# Patient Record
Sex: MALE | Race: WHITE | Employment: FULL TIME | URBAN - METROPOLITAN AREA
[De-identification: names, ages, dates, MRNs, and addresses within clinical notes are randomized per-mention and may not be internally consistent; named-entity substitution may affect disease eponyms.]

---

## 2021-03-22 ENCOUNTER — HOSPITAL ENCOUNTER (EMERGENCY)
Age: 31
Discharge: HOME | End: 2021-03-22

## 2021-03-22 VITALS
HEIGHT: 67 IN | DIASTOLIC BLOOD PRESSURE: 88 MMHG | TEMPERATURE: 97.3 F | OXYGEN SATURATION: 97 % | BODY MASS INDEX: 31.39 KG/M2 | WEIGHT: 200 LBS | RESPIRATION RATE: 18 BRPM | SYSTOLIC BLOOD PRESSURE: 149 MMHG | HEART RATE: 103 BPM

## 2021-03-22 DIAGNOSIS — L05.01 PILONIDAL ABSCESS: Primary | ICD-10-CM

## 2021-03-22 PROCEDURE — 87205 SMEAR GRAM STAIN: CPT

## 2021-03-22 PROCEDURE — 99283 EMERGENCY DEPT VISIT LOW MDM: CPT

## 2021-03-22 PROCEDURE — 10081 I&D PILONIDAL CYST COMP: CPT

## 2021-03-22 PROCEDURE — 87186 SC STD MICRODIL/AGAR DIL: CPT

## 2021-03-22 PROCEDURE — 87077 CULTURE AEROBIC IDENTIFY: CPT

## 2021-03-22 PROCEDURE — 87075 CULTR BACTERIA EXCEPT BLOOD: CPT

## 2021-03-22 PROCEDURE — 87070 CULTURE OTHR SPECIMN AEROBIC: CPT

## 2021-03-22 PROCEDURE — 87185 SC STD ENZYME DETCJ PER NZM: CPT

## 2021-03-22 RX ORDER — IBUPROFEN 800 MG/1
800 TABLET ORAL EVERY 8 HOURS PRN
Qty: 20 TABLET | Refills: 0 | Status: ON HOLD | OUTPATIENT
Start: 2021-03-22 | End: 2022-01-12 | Stop reason: HOSPADM

## 2021-03-22 RX ORDER — SULFAMETHOXAZOLE AND TRIMETHOPRIM 800; 160 MG/1; MG/1
2 TABLET ORAL 2 TIMES DAILY
Qty: 56 TABLET | Refills: 0 | Status: SHIPPED | OUTPATIENT
Start: 2021-03-22 | End: 2021-04-05

## 2021-03-22 ASSESSMENT — ENCOUNTER SYMPTOMS
ABDOMINAL PAIN: 0
CONSTIPATION: 0
DIARRHEA: 0

## 2021-03-22 ASSESSMENT — PAIN DESCRIPTION - DESCRIPTORS: DESCRIPTORS: ACHING;BURNING;PRESSURE;THROBBING

## 2021-03-22 NOTE — ED PROVIDER NOTES
[address] [facility] ED  eMERGENCYdEPARTMENT eNCOUnter      Pt Name: Loretta Chino  MRN: [id number]  [email or web address] [id number] evaluation: 3/22/2021  Kyle Ervin PA-C    CHIEF COMPLAINT       Chief Complaint   Patient presents with    Abscess     buttocks         HISTORY OF PRESENT ILLNESS  (Location/Symptom, Timing/Onset, Context/Setting, Quality, Duration, Modifying Factors, Severity.)   Loretta Chino is a 31 y.o. male who presents to the emergency department with a 4-day history of red swollen tender buttocks (pilonidal region). Patient reports to longstanding history of recurrent pilonidal cysts. He has seen the [address] [place] [address] numerous times for the same. He was hoping he can see a surgeon for definitive care. Moving bowels normally. Pain with movement or sitting. No numbness or extremities. The history is provided by the patient. Nursing Notes were reviewed and I agree. REVIEW OF SYSTEMS    (2-9 systems for level 4, 10 or more for level 5)     Review of Systems   Constitutional: Negative for fever. Gastrointestinal: Negative for abdominal pain, constipation and diarrhea. Skin:        Swollen red pilonidal region   Neurological: Negative for weakness and numbness. as noted above the remainder of the review of systems was reviewed and negative. PAST MEDICAL HISTORY   History reviewed. No pertinent past medical history. SURGICAL HISTORY     History reviewed. No pertinent surgical history. CURRENT MEDICATIONS       Discharge Medication List as of 3/22/2021  7:39 PM          ALLERGIES     Patient has no known allergies. HISTORY     History reviewed. No pertinent family history.        SOCIAL HISTORY       Social History     Socioeconomic History    Marital status:      Spouse name: None    Number of children: None    Years of education: None    Highest education level: None   Occupational History    None   Social Needs    Financial resource strain: None    Food insecurity     Worry: None     Inability: None    Transportation needs     Medical: None     Non-medical: None   Tobacco Use    Smoking status: Heavy Tobacco Smoker    Smokeless tobacco: Never Used   Substance and Sexual Activity    Alcohol use: Yes    Drug use: Never    Sexual activity: Yes   Lifestyle    Physical activity     Days per week: None     Minutes per session: None    Stress: None   Relationships    Social connections     Talks on phone: None     Gets together: None     Attends Sikhism service: None     Active member of club or organization: None     Attends meetings of clubs or organizations: None     Relationship status: None    Intimate partner violence     Fear of current or ex partner: None     Emotionally abused: None     Physically abused: None     Forced sexual activity: None   Other Topics Concern    None   Social History Narrative    None       SCREENINGS           PHYSICAL EXAM    (up to 7 forlevel 4, 8 or more for level 5)     ED Triage Vitals [03/22/21 1853]   BP Temp Temp Source Pulse Resp SpO2 Height Weight   (!) 149/88 97.3 °F (36.3 °C) Temporal 103 18 97 % 5' 7\" (1.702 m) 200 lb (90.7 kg)       Physical Exam  Vitals signs and nursing note reviewed. Constitutional:       General: He is not in acute distress. Appearance: He is well-developed. He is not diaphoretic. HENT:      Head: Normocephalic and atraumatic. Right Ear: External ear normal.      Left Ear: External ear normal.      Nose: Nose normal.   Eyes:      Conjunctiva/sclera: Conjunctivae normal.      Pupils: Pupils are equal, round, and reactive to light. Neck:      Musculoskeletal: Normal range of motion and neck supple. Pulmonary:      Effort: Pulmonary effort is normal.   Skin:     General: Skin is warm and dry. Neurological:      Mental Status: He is alert and oriented to person, place, and time.    Psychiatric:         Behavior: Behavior normal.           DIAGNOSTIC RESULTS RADIOLOGY:   Non-plain film images such as CT, Ultrasound and MRI are read by the radiologist. Plain radiographic images are visualized and preliminarilyinterpreted by Rosa Mott PA-C with the below findings:        Interpretation per the Radiologist below, if available at the time of this note:    No orders to display       LABS:  Labs Reviewed   CULTURE, ANAEROBIC AND AEROBIC       All other labs were within normal range or not returnedas of this dictation. EMERGENCYDEPARTMENT COURSE and DIFFERENTIAL DIAGNOSIS/MDM:   Vitals:    Vitals:    03/22/21 1853   BP: (!) 149/88   Pulse: 103   Resp: 18   Temp: 97.3 °F (36.3 °C)   TempSrc: Temporal   SpO2: 97%   Weight: 200 lb (90.7 kg)   Height: 5' 7\" (1.702 m)           MDM    PROCEDURES:    Procedures    PROCEDURES: Incision and Drainage:  Location: pilonidal region  Size: 5 cm x 2 cm  The Incision and Drainage procedure was explained to the patient and I receive verbal consent from them. Abscess was prepped with hibicleans. Patient declined injectable anesthetic so area was anesthetized with ethyl chloride topical spray. The area of most fluctuance was identified, and a number 11 blade was used to make a 1 cm incision. A blunt probe was used to deloculated the wounds and a moderate amount of purulent material was expressed. Wound culture was obtained. The wound was then packed with 0.25-inch iodoform gauze x 1.2 \" wick (patient cannot tolerate packing beyond this). The patient experienced some pain but generally tolerated the procedure quite well. An ABD pad was taped in place and the patient was given instructions to start warm compresses for the next 2-3 days at home. Patient will follow up with general surgery either through our referral or through the [facility]. A note for work was provided. Prescriptions for Motrin 800 and Bactrim DS were provided.       ELECTRONIC SIGNATURE   Rosa Mott PA-C   3/22/2021 7:47 PM         FINAL IMPRESSION      1. Pilonidal abscess          DISPOSITION/PLAN   DISPOSITION Decision To Discharge 03/22/2021 07:23:01 PM      PATIENT REFERRED TO:  Yissel Lujan MD  [address] [address] [phone]    In 2 days        DISCHARGE MEDICATIONS:  Discharge Medication List as of 3/22/2021  7:39 PM      START taking these medications    Details   sulfamethoxazole-trimethoprim (BACTRIM DS) 800-160 MG per tablet Take 2 tablets by mouth 2 times daily for 14 days, Disp-56 tablet, R-0Normal      ibuprofen (ADVIL;MOTRIN) 800 MG tablet Take 1 tablet by mouth every 8 hours as needed for Pain or Fever, Disp-20 tablet, R-0Normal             (Please note thatportions of this note were completed with a voice recognition program.  Efforts were made to edit the dictations but occasionally words are mis-transcribed.)    DOTTY Chong PA-C  03/22/21 1947

## 2021-03-26 LAB
ANAEROBIC CULTURE: ABNORMAL
GRAM STAIN RESULT: ABNORMAL
ORGANISM: ABNORMAL
ORGANISM: ABNORMAL
WOUND/ABSCESS: ABNORMAL

## 2021-06-28 ENCOUNTER — HOSPITAL ENCOUNTER (EMERGENCY)
Age: 31
Discharge: HOME | End: 2021-06-28

## 2021-06-28 VITALS
RESPIRATION RATE: 18 BRPM | WEIGHT: 190 LBS | SYSTOLIC BLOOD PRESSURE: 123 MMHG | OXYGEN SATURATION: 96 % | HEART RATE: 113 BPM | BODY MASS INDEX: 30.53 KG/M2 | HEIGHT: 66 IN | DIASTOLIC BLOOD PRESSURE: 68 MMHG | TEMPERATURE: 98 F

## 2021-06-28 DIAGNOSIS — L05.01 PILONIDAL ABSCESS: Primary | ICD-10-CM

## 2021-06-28 PROCEDURE — 99284 EMERGENCY DEPT VISIT MOD MDM: CPT

## 2021-06-28 PROCEDURE — 10081 I&D PILONIDAL CYST COMP: CPT

## 2021-06-28 PROCEDURE — 87075 CULTR BACTERIA EXCEPT BLOOD: CPT

## 2021-06-28 PROCEDURE — 87070 CULTURE OTHR SPECIMN AEROBIC: CPT

## 2021-06-28 PROCEDURE — 87205 SMEAR GRAM STAIN: CPT

## 2021-06-28 PROCEDURE — 6370000000 HC RX 637 (ALT 250 FOR IP): Performed by: PHYSICIAN ASSISTANT

## 2021-06-28 RX ORDER — ETHYL CHLORIDE 100 %
AEROSOL, SPRAY (ML) TOPICAL
Status: COMPLETED | OUTPATIENT
Start: 2021-06-28 | End: 2021-06-28

## 2021-06-28 RX ORDER — SULFAMETHOXAZOLE AND TRIMETHOPRIM 800; 160 MG/1; MG/1
1 TABLET ORAL ONCE
Status: COMPLETED | OUTPATIENT
Start: 2021-06-28 | End: 2021-06-28

## 2021-06-28 RX ORDER — HYDROCODONE BITARTRATE AND ACETAMINOPHEN 5; 325 MG/1; MG/1
1 TABLET ORAL EVERY 8 HOURS PRN
Qty: 9 TABLET | Refills: 0 | Status: SHIPPED | OUTPATIENT
Start: 2021-06-28 | End: 2021-07-01

## 2021-06-28 RX ORDER — METRONIDAZOLE 500 MG/1
500 TABLET ORAL ONCE
Status: COMPLETED | OUTPATIENT
Start: 2021-06-28 | End: 2021-06-28

## 2021-06-28 RX ORDER — SULFAMETHOXAZOLE AND TRIMETHOPRIM 800; 160 MG/1; MG/1
1 TABLET ORAL 2 TIMES DAILY
Qty: 20 TABLET | Refills: 0 | Status: SHIPPED | OUTPATIENT
Start: 2021-06-28 | End: 2021-07-08

## 2021-06-28 RX ORDER — METRONIDAZOLE 500 MG/1
500 TABLET ORAL 2 TIMES DAILY
Qty: 20 TABLET | Refills: 0 | Status: SHIPPED | OUTPATIENT
Start: 2021-06-28 | End: 2021-07-08

## 2021-06-28 RX ADMIN — Medication 116 ML: at 09:00

## 2021-06-28 RX ADMIN — SULFAMETHOXAZOLE AND TRIMETHOPRIM 1 TABLET: 800; 160 TABLET ORAL at 09:00

## 2021-06-28 RX ADMIN — METRONIDAZOLE 500 MG: 500 TABLET ORAL at 09:00

## 2021-06-28 ASSESSMENT — ENCOUNTER SYMPTOMS
SORE THROAT: 0
COUGH: 0
RHINORRHEA: 0
PHOTOPHOBIA: 0
VOMITING: 0
DIARRHEA: 0
ABDOMINAL PAIN: 0
EYE PAIN: 0
BACK PAIN: 0
NAUSEA: 0
SHORTNESS OF BREATH: 0

## 2021-06-28 ASSESSMENT — PAIN SCALES - GENERAL
PAINLEVEL_OUTOF10: 2
PAINLEVEL_OUTOF10: 10

## 2021-06-28 ASSESSMENT — PAIN DESCRIPTION - FREQUENCY
FREQUENCY: CONTINUOUS
FREQUENCY: CONTINUOUS

## 2021-06-28 ASSESSMENT — PAIN DESCRIPTION - PAIN TYPE
TYPE: CHRONIC PAIN
TYPE: ACUTE PAIN

## 2021-06-28 ASSESSMENT — PAIN DESCRIPTION - LOCATION
LOCATION: COCCYX
LOCATION: BUTTOCKS

## 2021-06-28 ASSESSMENT — PAIN DESCRIPTION - DESCRIPTORS
DESCRIPTORS: ACHING
DESCRIPTORS: ACHING;PRESSURE

## 2021-06-28 NOTE — ED PROVIDER NOTES
[address] [facility] ED  EMERGENCY DEPARTMENT ENCOUNTER      Pt Name: Hasmukh Uriostegui  MRN: [id number]  [place] 1990  Date of evaluation: 6/28/2021  Provider: Meagan Hernandez PA-C      HISTORY OF PRESENT ILLNESS    Hasmukh Uriostegui is a 31 y.o. male who presents to the Emergency Department with pilonidal abscess. This is been present for 4 days. He has a history of this and has had vision and drainage 11 times he is waiting to get in with the [place] surgery department to have it removed. It is tender to palpation there is been no drainage she does have history of this he denies any fevers. REVIEW OF SYSTEMS       Review of Systems   Constitutional: Negative for chills, diaphoresis, fatigue and fever. HENT: Negative for congestion, rhinorrhea and sore throat. Eyes: Negative for photophobia and pain. Respiratory: Negative for cough and shortness of breath. Cardiovascular: Negative for chest pain and palpitations. Gastrointestinal: Negative for abdominal pain, diarrhea, nausea and vomiting. Genitourinary: Negative for dysuria and flank pain. Musculoskeletal: Negative for back pain. Skin: Positive for wound. Negative for rash. Neurological: Negative for dizziness, light-headedness and headaches. Psychiatric/Behavioral: Negative. All other systems reviewed and are negative. PAST MEDICAL HISTORY   History reviewed. No pertinent past medical history. SURGICAL HISTORY     History reviewed. No pertinent surgical history. CURRENT MEDICATIONS       Previous Medications    IBUPROFEN (ADVIL;MOTRIN) 800 MG TABLET    Take 1 tablet by mouth every 8 hours as needed for Pain or Fever       ALLERGIES     Patient has no known allergies. FAMILY HISTORY     History reviewed. No pertinent family history.        SOCIAL HISTORY       Social History     Socioeconomic History    Marital status:      Spouse name: None    Number of children: None    Years of education: None    Highest education level: None   Occupational History    None   Tobacco Use    Smoking status: Heavy Tobacco Smoker    Smokeless tobacco: Never Used   Vaping Use    Vaping Use: Never used   Substance and Sexual Activity    Alcohol use: Yes    Drug use: Never    Sexual activity: Yes   Other Topics Concern    None   Social History Narrative    None     Social Determinants of Health     Financial Resource Strain:     Difficulty of Paying Living Expenses:    Food Insecurity:     Worried About Running Out of Food in the Last Year:     [address] in the Last Year:    Transportation Needs:     Lack of Transportation (Medical):  Lack of Transportation (Non-Medical):    Physical Activity:     Days of Exercise per Week:     Minutes of Exercise per Session:    Stress:     Feeling of Stress :    Social Connections:     Frequency of Communication with Friends and Family:     Frequency of Social Gatherings with Friends and Family:     Attends Anabaptism Services:     Active Member of Clubs or Organizations:     Attends Club or Organization Meetings:     Marital Status:    Intimate Partner Violence:     Fear of Current or Ex-Partner:     Emotionally Abused:     Physically Abused:     Sexually Abused:        SCREENINGS             PHYSICAL EXAM    (up to 7 for level 4, 8 or more for level 5)     ED Triage Vitals [06/28/21 0843]   BP Temp Temp Source Pulse Resp SpO2 Height Weight   (!) 140/91 98 °F (36.7 °C) Oral 112 18 96 % 5' 6\" (1.676 m) 190 lb (86.2 kg)       Physical Exam  Vitals and nursing note reviewed. Constitutional:       General: He is not in acute distress. Appearance: Normal appearance. He is well-developed. He is not diaphoretic. HENT:      Head: Normocephalic and atraumatic. Eyes:      General: Lids are normal.      Conjunctiva/sclera: Conjunctivae normal.   Cardiovascular:      Rate and Rhythm: Normal rate and regular rhythm. Pulses: Normal pulses.       Heart sounds: Normal heart sounds. Pulmonary:      Effort: Pulmonary effort is normal.      Breath sounds: Normal breath sounds. Abdominal:      General: Bowel sounds are normal.      Palpations: Abdomen is soft. Tenderness: There is no abdominal tenderness. Musculoskeletal:      Cervical back: Normal range of motion and neck supple. Lymphadenopathy:      Cervical: No cervical adenopathy. Skin:     General: Skin is warm and dry. Capillary Refill: Capillary refill takes less than 2 seconds. Findings: Abscess present. No rash. Neurological:      Mental Status: He is alert and oriented to person, place, and time. Psychiatric:         Thought Content: Thought content normal.         Judgment: Judgment normal.           All other labs were within normal range or not returned as of this dictation. EMERGENCY DEPARTMENT COURSE and DIFFERENTIALDIAGNOSIS/MDM:   Vitals:    Vitals:    06/28/21 0843   BP: (!) 140/91   Pulse: 112   Resp: 18   Temp: 98 °F (36.7 °C)   TempSrc: Oral   SpO2: 96%   Weight: 190 lb (86.2 kg)   Height: 5' 6\" (1.676 m)            Patient is pilonidal this is his 11th time needing it lanced. Copious drainage. Looking back on previous culture in March he grew gram-positive cocci in clusters, gram-negative rods as well as anaerobes. Patient will be prescribed Bactrim as well as Flagyl for the anaerobes that were on previous culture. Patient is instructed to follow-up with his family doctor in 2 days for wound recheck and to return to the ED for any new worse or concerning symptoms. Was given referral to general surgery and counseled on the course of pilonidal's. Patient verbalized understanding patient stable for discharge. PROCEDURES:  Unless otherwise noted below, none     Incision/Drainage    Date/Time: 6/28/2021 9:18 AM  Performed by: Evelia Santoyo PA-C  Authorized by:  Evelia Santoyo [place]     Consent:     Consent obtained:  Verbal    Consent given by:  Patient Risks discussed:  Bleeding, incomplete drainage, pain and infection    Alternatives discussed:  Referral, observation, alternative treatment, delayed treatment and no treatment  Location:     Type:  Pilonidal cyst    Size:  1.5    Location: Gluteal cleft right. Pre-procedure details:     Skin preparation:  Betadine and Chloraprep  Anesthesia (see MAR for exact dosages): Anesthesia method:  Topical application    Topical anesthesia: Ethyl chloride. Procedure type:     Complexity:  Simple  Procedure details:     Needle aspiration: no      Incision types:  Stab incision    Incision depth:  Dermal    Scalpel blade:  11    Wound management:  Probed and deloculated, irrigated with saline and extensive cleaning    Drainage:  Purulent    Drainage amount:  Copious    Wound treatment:  Wound left open    Packing materials:  None  Post-procedure details:     Patient tolerance of procedure: Tolerated well, no immediate complications          FINAL IMPRESSION      1.  Pilonidal abscess          DISPOSITION/PLAN   DISPOSITION Decision To Discharge 06/28/2021 09:11:52 AM          Bharati Barrientos (electronically signed)  Attending Emergency Physician       Army Randy PA-C  06/28/21 7745

## 2021-06-28 NOTE — ED NOTES
Pt states that he has a Pilonidal cyst on coccyx. Pt states that this is recurrent issue. Pt states he has had lanced previously. Pt states that he awaiting call from [place] for further treatment.        Dottie Gomez RN  06/28/21 9411

## 2021-06-28 NOTE — ED NOTES
Discharge education reviewed verbally and in writing. Instructed to follow up with PCP and come back to the ED with any new or worsening symptoms. No questions or concerns at this time. No s/s of distress noted at this time. Pt declined [place] prescription and stated that he would not take them and would use tylenol if needed for pain. Dressing remains on area to coccyx.       Jessica Whipple RN  06/28/21 2403

## 2021-06-28 NOTE — ED NOTES
Pt abscess drained by [facility] PA. Dressing applied to area and pt educated to keep area clean and to complete all ATB that are prescribed. [facility]       Noah Macias RN  06/28/21 3685

## 2021-06-28 NOTE — ED TRIAGE NOTES
Pt has co  Abscess to buttocks times two days. Pt states he has had them before and knows its time to come in. Pt is aox4 pwd.

## 2021-06-30 LAB
ANAEROBIC CULTURE: ABNORMAL
GRAM STAIN RESULT: ABNORMAL
ORGANISM: ABNORMAL
WOUND/ABSCESS: ABNORMAL

## 2021-10-18 ENCOUNTER — HOSPITAL ENCOUNTER (EMERGENCY)
Age: 31
Discharge: HOME | End: 2021-10-18
Payer: OTHER GOVERNMENT

## 2021-10-18 ENCOUNTER — APPOINTMENT (OUTPATIENT)
Dept: CT IMAGING | Age: 31
End: 2021-10-18
Payer: OTHER GOVERNMENT

## 2021-10-18 VITALS
TEMPERATURE: 97.1 F | HEIGHT: 66 IN | RESPIRATION RATE: 18 BRPM | SYSTOLIC BLOOD PRESSURE: 110 MMHG | HEART RATE: 95 BPM | OXYGEN SATURATION: 100 % | WEIGHT: 190 LBS | BODY MASS INDEX: 30.53 KG/M2 | DIASTOLIC BLOOD PRESSURE: 74 MMHG

## 2021-10-18 DIAGNOSIS — R07.9 CHEST PAIN, UNSPECIFIED TYPE: Primary | ICD-10-CM

## 2021-10-18 LAB
ALBUMIN SERPL-MCNC: 4.9 G/DL (ref 3.5–4.6)
ALP BLD-CCNC: 90 U/L (ref 35–104)
ALT SERPL-CCNC: <5 U/L (ref 0–41)
ANION GAP SERPL CALCULATED.3IONS-SCNC: 12 MEQ/L (ref 9–15)
APTT: 31.7 SEC (ref 24.4–36.8)
AST SERPL-CCNC: 54 U/L (ref 0–40)
BASOPHILS ABSOLUTE: 0.1 K/UL (ref 0–0.2)
BASOPHILS RELATIVE PERCENT: 0.7 %
BILIRUB SERPL-MCNC: 0.3 MG/DL (ref 0.2–0.7)
BUN BLDV-MCNC: 11 MG/DL (ref 6–20)
CALCIUM SERPL-MCNC: 9.8 MG/DL (ref 8.5–9.9)
CHLORIDE BLD-SCNC: 96 MEQ/L (ref 95–107)
CO2: 27 MEQ/L (ref 20–31)
CREAT SERPL-MCNC: 0.9 MG/DL (ref 0.7–1.2)
EOSINOPHILS ABSOLUTE: 0.1 K/UL (ref 0–0.7)
EOSINOPHILS RELATIVE PERCENT: 0.8 %
GFR AFRICAN AMERICAN: >60
GFR NON-AFRICAN AMERICAN: >60
GLOBULIN: 2.7 G/DL (ref 2.3–3.5)
GLUCOSE BLD-MCNC: 89 MG/DL (ref 70–99)
HCT VFR BLD CALC: 45.7 % (ref 42–52)
HEMOGLOBIN: 16.1 G/DL (ref 14–18)
INR BLD: 0.9
LYMPHOCYTES ABSOLUTE: 2.4 K/UL (ref 1–4.8)
LYMPHOCYTES RELATIVE PERCENT: 28.8 %
MAGNESIUM: 2.1 MG/DL (ref 1.7–2.4)
MCH RBC QN AUTO: 31.8 PG (ref 27–31.3)
MCHC RBC AUTO-ENTMCNC: 35.2 % (ref 33–37)
MCV RBC AUTO: 90.3 FL (ref 80–100)
MONOCYTES ABSOLUTE: 0.8 K/UL (ref 0.2–0.8)
MONOCYTES RELATIVE PERCENT: 9.3 %
NEUTROPHILS ABSOLUTE: 5.1 K/UL (ref 1.4–6.5)
NEUTROPHILS RELATIVE PERCENT: 60.4 %
PDW BLD-RTO: 13.8 % (ref 11.5–14.5)
PLATELET # BLD: 359 K/UL (ref 130–400)
POC CREATININE WHOLE BLOOD: 1.1
POTASSIUM SERPL-SCNC: 4.1 MEQ/L (ref 3.4–4.9)
PROTHROMBIN TIME: 12.4 SEC (ref 12.3–14.9)
RBC # BLD: 5.06 M/UL (ref 4.7–6.1)
SARS-COV-2, NAAT: NOT DETECTED
SODIUM BLD-SCNC: 135 MEQ/L (ref 135–144)
TOTAL PROTEIN: 7.6 G/DL (ref 6.3–8)
TROPONIN: <0.01 NG/ML (ref 0–0.01)
WBC # BLD: 8.5 K/UL (ref 4.8–10.8)

## 2021-10-18 PROCEDURE — 85610 PROTHROMBIN TIME: CPT

## 2021-10-18 PROCEDURE — 83735 ASSAY OF MAGNESIUM: CPT

## 2021-10-18 PROCEDURE — 87635 SARS-COV-2 COVID-19 AMP PRB: CPT

## 2021-10-18 PROCEDURE — 93005 ELECTROCARDIOGRAM TRACING: CPT | Performed by: EMERGENCY MEDICINE

## 2021-10-18 PROCEDURE — 71275 CT ANGIOGRAPHY CHEST: CPT

## 2021-10-18 PROCEDURE — 99283 EMERGENCY DEPT VISIT LOW MDM: CPT

## 2021-10-18 PROCEDURE — 6360000004 HC RX CONTRAST MEDICATION: Performed by: STUDENT IN AN ORGANIZED HEALTH CARE EDUCATION/TRAINING PROGRAM

## 2021-10-18 PROCEDURE — 80053 COMPREHEN METABOLIC PANEL: CPT

## 2021-10-18 PROCEDURE — 85730 THROMBOPLASTIN TIME PARTIAL: CPT

## 2021-10-18 PROCEDURE — 85025 COMPLETE CBC W/AUTO DIFF WBC: CPT

## 2021-10-18 PROCEDURE — 84484 ASSAY OF TROPONIN QUANT: CPT

## 2021-10-18 PROCEDURE — 36415 COLL VENOUS BLD VENIPUNCTURE: CPT

## 2021-10-18 RX ADMIN — IOPAMIDOL 100 ML: 755 INJECTION, SOLUTION INTRAVENOUS at 21:32

## 2021-10-18 ASSESSMENT — ENCOUNTER SYMPTOMS
PHOTOPHOBIA: 0
COUGH: 1
VOMITING: 0
NAUSEA: 0
SHORTNESS OF BREATH: 0
WHEEZING: 0
BLOOD IN STOOL: 0
CONSTIPATION: 0
DIARRHEA: 0
ABDOMINAL DISTENTION: 0
ABDOMINAL PAIN: 0

## 2021-10-18 ASSESSMENT — PAIN DESCRIPTION - DESCRIPTORS: DESCRIPTORS: ACHING

## 2021-10-18 ASSESSMENT — PAIN DESCRIPTION - FREQUENCY: FREQUENCY: CONTINUOUS

## 2021-10-18 ASSESSMENT — PAIN DESCRIPTION - LOCATION: LOCATION: CHEST

## 2021-10-18 ASSESSMENT — PAIN SCALES - GENERAL: PAINLEVEL_OUTOF10: 6

## 2021-10-18 ASSESSMENT — PAIN DESCRIPTION - PAIN TYPE: TYPE: ACUTE PAIN

## 2021-10-19 LAB
EKG ATRIAL RATE: 104 BPM
EKG P AXIS: 16 DEGREES
EKG P-R INTERVAL: 148 MS
EKG Q-T INTERVAL: 316 MS
EKG QRS DURATION: 100 MS
EKG QTC CALCULATION (BAZETT): 415 MS
EKG R AXIS: -16 DEGREES
EKG T AXIS: 46 DEGREES
EKG VENTRICULAR RATE: 104 BPM
GFR AFRICAN AMERICAN: >60
GFR NON-AFRICAN AMERICAN: >60
PERFORMED ON: NORMAL
POC CREATININE: 1.1 MG/DL (ref 0.9–1.3)
POC SAMPLE TYPE: NORMAL

## 2021-10-19 PROCEDURE — 93010 ELECTROCARDIOGRAM REPORT: CPT | Performed by: INTERNAL MEDICINE

## 2021-10-19 NOTE — ED PROVIDER NOTES
[address] [facility] ED  EMERGENCY DEPARTMENT ENCOUNTER      Pt Name: Major Carrel  MRN: [id number]  [place] 1990  Date of evaluation: 10/18/2021  Provider: Ida Platt Dr       Chief Complaint   Patient presents with    Chest Pain         HISTORY OF PRESENT ILLNESS   (Location/Symptom, Timing/Onset, Context/Setting, Quality, Duration, Modifying Factors, Severity)  Note limiting factors. Major Carrel is a 27 y.o. male who per chart review has no past medical history presents to the emergency department evaluation of gradual onset intermittent moderate 6 out of 10 substernal nonradiating chest pain that began around 1:00 this afternoon while driving. Patient states that he has had 3-4 episodes of sharp and stabbing pain that lasted a few seconds at a time and resolve spontaneously however there is a constant very mild dull heaviness in the substernal region. He states pain was worse with deep inspiration. He did not take anything for symptoms. He states he had a similar episode in the past in which he went to the emergency room for and was diagnosed with anxiety. States he is not feeling particularly anxious or stressed at this time. He has not been vaccinated for Covid no known exposures. He states he began to develop a mild dry cough today. He denies any history of cardiac events PE or DVT. No recent travel periods of immobilization hospitalizations or surgeries. He is an every day smoker. Chest pain is nonexertional is not associated with shortness of breath nausea vomiting diaphoresis or dizziness. No recent injuries to the chest wall. Denies fever chills shortness of breath abdominal pain nausea vomiting diarrhea back or flank pain dizziness diaphoresis orthopnea dyspnea on exertion leg swelling hemoptysis. HPI    Nursing Notes were reviewed.     REVIEW OF SYSTEMS    (2-9 systems for level 4, 10 or more for level 5)     Review of Systems   Constitutional: Negative for chills, fatigue and fever. HENT: Negative for congestion. Eyes: Negative for photophobia. Respiratory: Positive for cough. Negative for shortness of breath and wheezing. Cardiovascular: Positive for chest pain. Negative for palpitations and leg swelling. Gastrointestinal: Negative for abdominal distention, abdominal pain, blood in stool, constipation, diarrhea, nausea and vomiting. Genitourinary: Negative for dysuria, frequency and hematuria. Musculoskeletal: Negative for myalgias. Allergic/Immunologic: Negative for immunocompromised state. Neurological: Negative for dizziness, weakness and headaches. All other systems reviewed and are negative. Except as noted above the remainder of the review of systems was reviewed and negative. PAST MEDICAL HISTORY   History reviewed. No pertinent past medical history. SURGICAL HISTORY     History reviewed. No pertinent surgical history. CURRENT MEDICATIONS       Discharge Medication List as of 10/18/2021  9:55 PM      CONTINUE these medications which have NOT CHANGED    Details   ibuprofen (ADVIL;MOTRIN) 800 MG tablet Take 1 tablet by mouth every 8 hours as needed for Pain or Fever, Disp-20 tablet, R-0Normal             ALLERGIES     Patient has no known allergies. FAMILY HISTORY     History reviewed. No pertinent family history. SOCIAL HISTORY       Social History     Socioeconomic History    Marital status:      Spouse name: None    Number of children: None    Years of education: None    Highest education level: None   Occupational History    None   Tobacco Use    Smoking status: Heavy Tobacco Smoker    Smokeless tobacco: Never Used   Vaping Use    Vaping Use: Never used   Substance and Sexual Activity    Alcohol use: Yes    Drug use:  Yes    Sexual activity: Yes   Other Topics Concern    None   Social History Narrative    None     Social Determinants of Health     Financial Resource Strain:  Difficulty of Paying Living Expenses:    Food Insecurity:     Worried About Running Out of Food in the Last Year:     Ran Out of Food in the Last Year:    Transportation Needs:     Lack of Transportation (Medical):  Lack of Transportation (Non-Medical):    Physical Activity:     Days of Exercise per Week:     Minutes of Exercise per Session:    Stress:     Feeling of Stress :    Social Connections:     Frequency of Communication with Friends and Family:     Frequency of Social Gatherings with Friends and Family:     Attends [employer] Services:     Active Member of Clubs or Organizations:     Attends Club or Organization Meetings:     Marital Status:    Intimate Partner Violence:     Fear of Current or Ex-Partner:     Emotionally Abused:     Physically Abused:     Sexually Abused:        SCREENINGS                        PHYSICAL EXAM    (up to 7 for level 4, 8 or more for level 5)     ED Triage Vitals [10/18/21 1644]   BP Temp Temp Source Pulse Resp SpO2 Height Weight   108/85 97.1 °F (36.2 °C) Temporal 100 18 98 % 5' 6\" (1.676 m) 190 lb (86.2 kg)       Physical Exam  Constitutional:       General: He is not in acute distress. Appearance: He is well-developed. He is not ill-appearing, toxic-appearing or diaphoretic. HENT:      Head: Normocephalic and atraumatic. Right Ear: Tympanic membrane, ear canal and external ear normal.      Left Ear: Tympanic membrane, ear canal and external ear normal.      Nose: Nose normal.      Mouth/Throat:      Mouth: Mucous membranes are moist.   Eyes:      Pupils: Pupils are equal, round, and reactive to light. Cardiovascular:      Rate and Rhythm: Regular rhythm. Tachycardia present. Heart sounds: No murmur heard. No friction rub. No gallop. Pulmonary:      Effort: Pulmonary effort is normal. No respiratory distress. Breath sounds: Normal breath sounds. No wheezing, rhonchi or rales. Chest:      Chest wall: Tenderness present.  No deformity, swelling, crepitus or edema. There is no dullness to percussion. Comments: Chest pain is reproducible  Abdominal:      General: Bowel sounds are normal. There is no distension. Palpations: Abdomen is soft. Tenderness: There is no abdominal tenderness. There is no guarding or rebound. Musculoskeletal:         General: No swelling. Cervical back: Normal range of motion. Right lower leg: No edema. Left lower leg: No edema. Skin:     General: Skin is warm and dry. Capillary Refill: Capillary refill takes less than 2 seconds. Findings: No rash. Neurological:      General: No focal deficit present. Mental Status: He is alert and oriented to person, place, and time. DIAGNOSTIC RESULTS     EKG: All EKG's are interpreted by the Emergency Department Physician who either signs or Co-signs this chart in the absence of a cardiologist.    EKG shows sinus tach with , normal axis, normal intervals, no ST changes. RADIOLOGY:   Non-plain film images such as CT, Ultrasound and MRI are read by the radiologist. Plain radiographic images are visualized and preliminarily interpreted by the emergency physician with the below findings:      Interpretation per the Radiologist below, if available at the time of this note:    CTA Chest W WO  (PE study)   Final Result   1. THIS IS A TECHNICALLY SUBOPTIMAL EXAMINATION DUE TO LACK OF OPACIFICATION. AN ATTEMPT WAS MADE TO REPEAT THE EXAM HOWEVER THE PATIENT DECLINED TO REPEAT EXAM   2. VISUALIZED PULMONARY PARENCHYMA IS UNREMARKABLE. All CT scans at this facility use dose modulation, iterative reconstruction, and/or weight based dosing when appropriate to reduce radiation dose to as low as reasonably achievable.                   ED BEDSIDE ULTRASOUND:   Performed by ED Physician - none    LABS:  Labs Reviewed   COMPREHENSIVE METABOLIC PANEL - Abnormal; Notable for the following components:       Result Value Albumin 4.9 (*)     AST 54 (*)     All other components within normal limits   CBC WITH AUTO DIFFERENTIAL - Abnormal; Notable for the following components:    MCH 31.8 (*)     All other components within normal limits   POCT CREATININE - URINE - Normal   COVID-19, RAPID   TROPONIN   MAGNESIUM   APTT   PROTIME-INR       All other labs were within normal range or not returned as of this dictation. EMERGENCY DEPARTMENT COURSE and DIFFERENTIAL DIAGNOSIS/MDM:   Vitals:    Vitals:    10/18/21 1644 10/18/21 2205   BP: 108/85 110/74   Pulse: 100 95   Resp: 18 18   Temp: 97.1 °F (36.2 °C)    TempSrc: Temporal    SpO2: 98% 100%   Weight: 190 lb (86.2 kg)    Height: 5' 6\" (1.676 m)        MDM     Patient is a 27-year-old male presents the ED for evaluation of chest pain. He is afebrile and hemodynamically stable. EKG shows sinus tach with , normal axis, normal intervals, no ST changes. Labs are unremarkable. Troponin is within normal limits. He is Covid negative. CT of the chest is negative for acute abnormality. Suboptimal exam due to lack of opacification an attempt was made to repeat the exam however patient declined. He states that he was feeling flushed from the contrast. Very low concern for PE at this time, HR improved to 95 and he does not have risk factors. Patient is nontoxic-appearing with stable vitals and stable for discharge. Chest pain reproducible. Suspect musculoskeletal chest pain versus pleurisy. He will be referred to cardiology for follow-up. Return to the ED for worsening symptoms given warning signs for which she should return. Patient understands agrees to plan. REASSESSMENT          CRITICAL CARE TIME   Total Critical Care time was 0 minutes, excluding separately reportable procedures. There was a high probability of clinically significant/life threatening deterioration in the patient's condition which required my urgent intervention. CONSULTS:  None    PROCEDURES:  Unless otherwise noted below, none     Procedures        FINAL IMPRESSION      1. Chest pain, unspecified type          DISPOSITION/PLAN   DISPOSITION Decision To Discharge 10/18/2021 09:50:19 PM      PATIENT REFERRED TO:  [facility]) ED  [address] [address]  [phone]  Go to   As needed, If symptoms worsen    Dale Fox MD  [address] [address]  [phone]    Schedule an appointment as soon as possible for a visit in 1 day        DISCHARGE MEDICATIONS:  Discharge Medication List as of 10/18/2021  9:55 PM        Controlled Substances Monitoring:     No flowsheet data found.     (Please note that portions of this note were completed with a voice recognition program.  Efforts were made to edit the dictations but occasionally words are mis-transcribed.)    Warren Guerrero PA-C (electronically signed)             Warren Guerrero PA-C  10/19/21 4337

## 2022-01-09 ENCOUNTER — APPOINTMENT (OUTPATIENT)
Dept: CT IMAGING | Age: 32
DRG: 885 | End: 2022-01-09
Payer: OTHER GOVERNMENT

## 2022-01-09 ENCOUNTER — HOSPITAL ENCOUNTER (INPATIENT)
Age: 32
LOS: 2 days | Discharge: HOME | DRG: 885 | End: 2022-01-12
Attending: PSYCHIATRY & NEUROLOGY | Admitting: PSYCHIATRY & NEUROLOGY
Payer: OTHER GOVERNMENT

## 2022-01-09 DIAGNOSIS — F33.9 RECURRENT MAJOR DEPRESSIVE DISORDER, REMISSION STATUS UNSPECIFIED (HCC): Primary | ICD-10-CM

## 2022-01-09 LAB
ACETAMINOPHEN LEVEL: <5 UG/ML (ref 10–30)
ALBUMIN SERPL-MCNC: 4.5 G/DL (ref 3.5–4.6)
ALP BLD-CCNC: 91 U/L (ref 35–104)
ALT SERPL-CCNC: 29 U/L (ref 0–41)
AMYLASE: 122 U/L (ref 22–93)
ANION GAP SERPL CALCULATED.3IONS-SCNC: 13 MEQ/L (ref 9–15)
AST SERPL-CCNC: 34 U/L (ref 0–40)
BASOPHILS ABSOLUTE: 0 K/UL (ref 0–0.2)
BASOPHILS RELATIVE PERCENT: 0.7 %
BILIRUB SERPL-MCNC: <0.2 MG/DL (ref 0.2–0.7)
BUN BLDV-MCNC: 9 MG/DL (ref 6–20)
CALCIUM SERPL-MCNC: 9 MG/DL (ref 8.5–9.9)
CHLORIDE BLD-SCNC: 103 MEQ/L (ref 95–107)
CO2: 25 MEQ/L (ref 20–31)
CREAT SERPL-MCNC: 1.02 MG/DL (ref 0.7–1.2)
EOSINOPHILS ABSOLUTE: 0 K/UL (ref 0–0.7)
EOSINOPHILS RELATIVE PERCENT: 0.9 %
ETHANOL PERCENT: 0.26 G/DL
ETHANOL: 298 MG/DL (ref 0–0.08)
GFR AFRICAN AMERICAN: >60
GFR NON-AFRICAN AMERICAN: >60
GLOBULIN: 3.8 G/DL (ref 2.3–3.5)
GLUCOSE BLD-MCNC: 110 MG/DL (ref 70–99)
HCT VFR BLD CALC: 44.5 % (ref 42–52)
HEMOGLOBIN: 15.1 G/DL (ref 14–18)
LACTIC ACID: 3.4 MMOL/L (ref 0.5–2.2)
LIPASE: 55 U/L (ref 12–95)
LYMPHOCYTES ABSOLUTE: 1.8 K/UL (ref 1–4.8)
LYMPHOCYTES RELATIVE PERCENT: 32.4 %
MAGNESIUM: 2.2 MG/DL (ref 1.7–2.4)
MCH RBC QN AUTO: 30.2 PG (ref 27–31.3)
MCHC RBC AUTO-ENTMCNC: 33.9 % (ref 33–37)
MCV RBC AUTO: 89.3 FL (ref 80–100)
MONOCYTES ABSOLUTE: 0.4 K/UL (ref 0.2–0.8)
MONOCYTES RELATIVE PERCENT: 7.3 %
NEUTROPHILS ABSOLUTE: 3.2 K/UL (ref 1.4–6.5)
NEUTROPHILS RELATIVE PERCENT: 58.7 %
PDW BLD-RTO: 14.5 % (ref 11.5–14.5)
PLATELET # BLD: 339 K/UL (ref 130–400)
POTASSIUM SERPL-SCNC: 4.7 MEQ/L (ref 3.4–4.9)
RBC # BLD: 4.98 M/UL (ref 4.7–6.1)
SALICYLATE, SERUM: <0.3 MG/DL (ref 15–30)
SODIUM BLD-SCNC: 141 MEQ/L (ref 135–144)
TOTAL CK: 108 U/L (ref 0–190)
TOTAL PROTEIN: 8.3 G/DL (ref 6.3–8)
TSH SERPL DL<=0.05 MIU/L-ACNC: 1.31 UIU/ML (ref 0.44–3.86)
WBC # BLD: 5.5 K/UL (ref 4.8–10.8)

## 2022-01-09 PROCEDURE — 82150 ASSAY OF AMYLASE: CPT

## 2022-01-09 PROCEDURE — 99285 EMERGENCY DEPT VISIT HI MDM: CPT

## 2022-01-09 PROCEDURE — 80143 DRUG ASSAY ACETAMINOPHEN: CPT

## 2022-01-09 PROCEDURE — 82550 ASSAY OF CK (CPK): CPT

## 2022-01-09 PROCEDURE — 83735 ASSAY OF MAGNESIUM: CPT

## 2022-01-09 PROCEDURE — 83605 ASSAY OF LACTIC ACID: CPT

## 2022-01-09 PROCEDURE — 6360000002 HC RX W HCPCS

## 2022-01-09 PROCEDURE — 80053 COMPREHEN METABOLIC PANEL: CPT

## 2022-01-09 PROCEDURE — 2580000003 HC RX 258

## 2022-01-09 PROCEDURE — 84443 ASSAY THYROID STIM HORMONE: CPT

## 2022-01-09 PROCEDURE — 36415 COLL VENOUS BLD VENIPUNCTURE: CPT

## 2022-01-09 PROCEDURE — 2580000003 HC RX 258: Performed by: PHYSICIAN ASSISTANT

## 2022-01-09 PROCEDURE — 82077 ASSAY SPEC XCP UR&BREATH IA: CPT

## 2022-01-09 PROCEDURE — 6360000002 HC RX W HCPCS: Performed by: PHYSICIAN ASSISTANT

## 2022-01-09 PROCEDURE — 85025 COMPLETE CBC W/AUTO DIFF WBC: CPT

## 2022-01-09 PROCEDURE — 83690 ASSAY OF LIPASE: CPT

## 2022-01-09 PROCEDURE — 96365 THER/PROPH/DIAG IV INF INIT: CPT

## 2022-01-09 PROCEDURE — 2500000003 HC RX 250 WO HCPCS: Performed by: PHYSICIAN ASSISTANT

## 2022-01-09 PROCEDURE — 96372 THER/PROPH/DIAG INJ SC/IM: CPT

## 2022-01-09 PROCEDURE — 80179 DRUG ASSAY SALICYLATE: CPT

## 2022-01-09 RX ORDER — DIPHENHYDRAMINE HYDROCHLORIDE 50 MG/ML
25 INJECTION INTRAMUSCULAR; INTRAVENOUS ONCE
Status: COMPLETED | OUTPATIENT
Start: 2022-01-09 | End: 2022-01-09

## 2022-01-09 RX ORDER — ZIPRASIDONE MESYLATE 20 MG/ML
20 INJECTION, POWDER, LYOPHILIZED, FOR SOLUTION INTRAMUSCULAR ONCE
Status: COMPLETED | OUTPATIENT
Start: 2022-01-09 | End: 2022-01-09

## 2022-01-09 RX ORDER — LORAZEPAM 2 MG/ML
1 INJECTION INTRAMUSCULAR ONCE
Status: DISCONTINUED | OUTPATIENT
Start: 2022-01-09 | End: 2022-01-09

## 2022-01-09 RX ORDER — ONDANSETRON 2 MG/ML
4 INJECTION INTRAMUSCULAR; INTRAVENOUS ONCE
Status: DISCONTINUED | OUTPATIENT
Start: 2022-01-09 | End: 2022-01-10

## 2022-01-09 RX ORDER — LORAZEPAM 2 MG/ML
2 INJECTION INTRAMUSCULAR ONCE
Status: COMPLETED | OUTPATIENT
Start: 2022-01-09 | End: 2022-01-09

## 2022-01-09 RX ORDER — 0.9 % SODIUM CHLORIDE 0.9 %
1000 INTRAVENOUS SOLUTION INTRAVENOUS ONCE
Status: DISCONTINUED | OUTPATIENT
Start: 2022-01-09 | End: 2022-01-10

## 2022-01-09 RX ADMIN — WATER: 1 INJECTION, SOLUTION INTRAMUSCULAR; INTRAVENOUS; SUBCUTANEOUS at 18:43

## 2022-01-09 RX ADMIN — DIPHENHYDRAMINE HYDROCHLORIDE 25 MG: 50 INJECTION, SOLUTION INTRAMUSCULAR; INTRAVENOUS at 18:39

## 2022-01-09 RX ADMIN — LORAZEPAM 2 MG: 2 INJECTION INTRAMUSCULAR; INTRAVENOUS at 18:40

## 2022-01-09 RX ADMIN — THIAMINE HYDROCHLORIDE: 100 INJECTION, SOLUTION INTRAMUSCULAR; INTRAVENOUS at 17:44

## 2022-01-09 RX ADMIN — ZIPRASIDONE MESYLATE 20 MG: 20 INJECTION, POWDER, LYOPHILIZED, FOR SOLUTION INTRAMUSCULAR at 18:41

## 2022-01-09 ASSESSMENT — ENCOUNTER SYMPTOMS
NAUSEA: 1
ABDOMINAL DISTENTION: 0
BACK PAIN: 0
VOMITING: 0
COLOR CHANGE: 0
ABDOMINAL PAIN: 1
RHINORRHEA: 0
SORE THROAT: 0
EYE DISCHARGE: 0
SHORTNESS OF BREATH: 0
CONSTIPATION: 0

## 2022-01-09 NOTE — ED TRIAGE NOTES
Pt c/o RLQ pain and vomiting. Pt states he is a daily drinker. Pt also states he does feel suicidal and has a plan to kill himself with a gun.

## 2022-01-09 NOTE — ED NOTES
EMS place a saline lock in the pt's right AC and the pt pulled it out stating, \"It's not needed. \"     Adeola Salgado RN  01/09/22 9990

## 2022-01-09 NOTE — ED PROVIDER NOTES
[facility]  eMERGENCY dEPARTMENT eNCOUnter      Pt Name: Shade Chan  MRN: [id number]  [place] 1990  Date of evaluation: 1/9/2022  Provider: ARIELLE Bear    CHIEF COMPLAINT       Chief Complaint   Patient presents with    Abdominal Pain         HISTORY OF PRESENT ILLNESS   (Location/Symptom, Timing/Onset,Context/Setting, Quality, Duration, Modifying Factors, Severity)  Note limiting factors. Shade Chan is a 27 y.o. male who presents to the emergency department complaint of intense, right lower quadrant abdominal pain, which patient states started approximately 1 to 2 hours ago. Patient states that he does drink heavily, he states he has had 2 bottles of Crown Royal so far today. He states he has been a drinker for many years. He states he does not want assistance for alcohol rehab, he is he is just been through a program recently, and has began to drink again. He states that he has no desire to stop drinking, \"I like it\". Patient states pain is sharp and intense to the right lower quadrant, there is no radiation, there is mild nausea associated with it. Patient states that he has no hematemesis, but states he has had some bleeding within his stool in the past, but none over the last couple days. Patient states his primary care is managed through the [facility].  Per patient only past medical history is that of alcohol use disorder. Patient had advised the nurse during triage process that he was feeling depressed, suicidal, and that he had a gun at home, and had increasing thoughts of wanting to kill himself. HPI    NursingNotes were reviewed. REVIEW OF SYSTEMS    (2-9 systems for level 4, 10 or more for level 5)     Review of Systems   Constitutional: Negative for activity change and appetite change. HENT: Negative for congestion, ear discharge, ear pain, nosebleeds, rhinorrhea and sore throat. Eyes: Negative for discharge.    Respiratory: Negative for shortness of breath. Cardiovascular: Negative for chest pain, palpitations and leg swelling. Gastrointestinal: Positive for abdominal pain and nausea. Negative for abdominal distention, constipation and vomiting. Genitourinary: Negative for difficulty urinating, dysuria, flank pain, frequency and hematuria. Musculoskeletal: Negative for arthralgias and back pain. Skin: Negative for color change, pallor, rash and wound. Neurological: Negative for dizziness, tremors, syncope, weakness, numbness and headaches. Psychiatric/Behavioral: Negative for agitation and confusion. Except as noted above the remainder of the review of systems was reviewed and negative. PAST MEDICAL HISTORY     Past Medical History:   Diagnosis Date    Alcohol abuse     Depression 1/10/2022         SURGICALHISTORY     History reviewed. No pertinent surgical history. CURRENT MEDICATIONS       Current Discharge Medication List      CONTINUE these medications which have NOT CHANGED    Details   ibuprofen (ADVIL;MOTRIN) 800 MG tablet Take 1 tablet by mouth every 8 hours as needed for Pain or Fever  Qty: 20 tablet, Refills: 0             ALLERGIES     Patient has no known allergies. FAMILY HISTORY     History reviewed. No pertinent family history. SOCIAL HISTORY       Social History     Socioeconomic History    Marital status:      Spouse name: None    Number of children: None    Years of education: None    Highest education level: None   Occupational History    None   Tobacco Use    Smoking status: Heavy Tobacco Smoker    Smokeless tobacco: Never Used   Vaping Use    Vaping Use: Never used   Substance and Sexual Activity    Alcohol use: Yes    Drug use:  Yes    Sexual activity: Yes   Other Topics Concern    None   Social History Narrative    None     Social Determinants of Health     Financial Resource Strain:     Difficulty of Paying Living Expenses: Not on file   Food Insecurity:     Worried About Running Out of Food in the Last Year: Not on file    Ran Out of Food in the Last Year: Not on file   Transportation Needs:     Lack of Transportation (Medical): Not on file    Lack of Transportation (Non-Medical): Not on file   Physical Activity:     Days of Exercise per Week: Not on file    Minutes of Exercise per Session: Not on file   Stress:     Feeling of Stress : Not on file   Social Connections:     Frequency of Communication with Friends and Family: Not on file    Frequency of Social Gatherings with Friends and Family: Not on file    Attends Advent Services: Not on file    Active Member of [address] [place] [address] [employer] or Organizations: Not on file    Attends Club or Organization Meetings: Not on file    Marital Status: Not on file   Intimate Partner Violence:     Fear of Current or Ex-Partner: Not on file    Emotionally Abused: Not on file    Physically Abused: Not on file    Sexually Abused: Not on file   Housing Stability:     Unable to Pay for Housing in the Last Year: Not on file    Number of [place] in the Last Year: Not on file    Unstable Housing in the Last Year: Not on file       SCREENINGS    Jess Coma Scale  Eye Opening: Spontaneous  Best Verbal Response: Oriented  Best Motor Response: Obeys commands  Jess Coma Scale Score: 15 @FLOW([id number])@      PHYSICAL EXAM    (up to 7 for level 4, 8 or more for level 5)     ED Triage Vitals [01/09/22 1635]   BP Temp Temp Source Pulse Resp SpO2 Height Weight   (!) 130/92 97.7 °F (36.5 °C) Oral 92 16 97 % 5' 6\" (1.676 m) 200 lb (90.7 kg)       Physical Exam  Vitals and nursing note reviewed. Constitutional:       General: He is not in acute distress. Appearance: He is well-developed. He is not ill-appearing, toxic-appearing or diaphoretic. HENT:      Head: Normocephalic. Nose: No congestion. Mouth/Throat:      Mouth: Mucous membranes are moist.      Pharynx: No oropharyngeal exudate or posterior oropharyngeal erythema.    Eyes: Extraocular Movements: Extraocular movements intact. Conjunctiva/sclera: Conjunctivae normal.      Pupils: Pupils are equal, round, and reactive to light. Neck:      Vascular: No JVD. Trachea: No tracheal deviation. Cardiovascular:      Rate and Rhythm: Normal rate. Pulses: Normal pulses. Heart sounds: Normal heart sounds. No murmur heard. No friction rub. No gallop. Pulmonary:      Effort: Pulmonary effort is normal. No tachypnea, accessory muscle usage, respiratory distress or retractions. Breath sounds: No stridor. No wheezing, rhonchi or rales. Chest:      Chest wall: No tenderness. Abdominal:      General: Abdomen is flat. Bowel sounds are normal. There is no distension or abdominal bruit. Palpations: There is no shifting dullness, fluid wave, hepatomegaly, splenomegaly, mass or pulsatile mass. Tenderness: There is no abdominal tenderness. There is no right CVA tenderness, left CVA tenderness, guarding or rebound. Negative signs include Jarquin's sign, Rovsing's sign and McBurney's sign. Comments: Abdomen is soft nondistended nontender no guarding mass rebound, no CVA tenderness. No facial grimace on examination. Musculoskeletal:         General: No deformity. Cervical back: Normal range of motion and neck supple. No rigidity. Skin:     General: Skin is warm and dry. Capillary Refill: Capillary refill takes less than 2 seconds. Coloration: Skin is not jaundiced. Neurological:      General: No focal deficit present. Mental Status: He is alert and oriented to person, place, and time. Mental status is at baseline. Cranial Nerves: No cranial nerve deficit. Sensory: No sensory deficit. Motor: No weakness.       Coordination: Coordination normal.   Psychiatric:         Mood and Affect: Mood normal.         DIAGNOSTIC RESULTS     EKG: All EKG's are interpreted by the Emergency Department Physician who either signs or Co-signsthis chart in the absence of a cardiologist.    na    RADIOLOGY:   Non-plain filmimages such as CT, Ultrasound and MRI are read by the radiologist. Plain radiographic images are visualized and preliminarily interpreted by the emergency physician with the below findings:    NA    Interpretation per the Radiologist below, if available at the time ofthis note:    No orders to display         ED BEDSIDE ULTRASOUND:   Performed by ED Physician - none    LABS:  Labs Reviewed   COMPREHENSIVE METABOLIC PANEL - Abnormal; Notable for the following components:       Result Value    Glucose 110 (*)     Total Protein 8.3 (*)     Globulin 3.8 (*)     All other components within normal limits   LACTIC ACID, PLASMA - Abnormal; Notable for the following components:    Lactic Acid 3.4 (*)     All other components within normal limits    Narrative:     Denzel Noble  [facility] tel. [phone],  LACT results called to and read back by Trinidad Sánchez, 01/09/2022 18:20, by  [facility]   AMYLASE - Abnormal; Notable for the following components:    Amylase 122 (*)     All other components within normal limits   ACETAMINOPHEN LEVEL - Abnormal; Notable for the following components:    Acetaminophen Level <5 (*)     All other components within normal limits   SALICYLATE LEVEL - Abnormal; Notable for the following components:    Salicylate, Serum <5.3 (*)     All other components within normal limits   POCT VENOUS - Abnormal; Notable for the following components:    POC Creatinine 1.4 (*)     GFR Non- 59 (*)     All other components within normal limits   URINE DRUG SCREEN   CBC WITH AUTO DIFFERENTIAL   LIPASE   MAGNESIUM   ETHANOL   CK   TSH WITHOUT REFLEX   ETHANOL       All other labs were within normal range or not returned as of this dictation.     EMERGENCY DEPARTMENT COURSE and DIFFERENTIAL DIAGNOSIS/MDM:   Vitals:    Vitals:    01/10/22 1815 01/10/22 2100 01/11/22 0756 01/11/22 0757   BP: (!) 162/98 138/76 (!) 151/111 (!) 149/104   Pulse:   97 96   Resp:   18    Temp:   98.8 °F (37.1 °C)    TempSrc:       SpO2:   98% 99%   Weight:       Height:           27 y.o. male who presents to the emergency department complaint of intense, right lower quadrant abdominal pain, which patient states started approximately 1 to 2 hours ago. Patient states that he does drink heavily, he states he has had 2 bottles of Crown Royal so far today. He is intoxicated. Ethanol 0.261. Patient also admits to suicidal ideation as well as plan to complete this with gun at home. He states he has had these thoughts for several years but increasing recently. He states he wishes to leave the ED as his abdominal pain completely resolves however with suicidal ideation as well as intoxication patient will need to remain in ED for behavioral evaluation. He pulls out 2 IVs. He is refusing CT scan. States his pain has resolved completely. Given IM Geodon IM ativan IM benadryl. His lactate is 3.4 would like to hydrate however refusing IV placement. Lipase 55, amylase 122, no baseline for comparison again patient is refusing CT. Continues to report pain resolution. Pt has extended stay in ED on behavioral health hold. MDM    CRITICAL CARE TIME   Total Critical Care time was 0 minutes, excluding separately reportableprocedures. There was a high probability of clinicallysignificant/life threatening deterioration in the patient's condition which required my urgent intervention. 0    CONSULTS:  IP CONSULT TO HOSPITALIST    PROCEDURES:  Unless otherwise noted below, none     Procedures    FINAL IMPRESSION      1. Recurrent major depressive disorder, remission status unspecified ([facility]          DISPOSITION/PLAN   DISPOSITION Admitted 01/10/2022 01:34:07 PM      PATIENT REFERRED TO:  No follow-up provider specified.     DISCHARGE MEDICATIONS:  Current Discharge Medication List             (Please note that portions of this note were completed with a voice recognition program.  Efforts were made to edit the dictations but occasionally words are mis-transcribed.)    ARIELLE Sharma (electronically signed)  Attending Emergency Physician         Napoleon Sharmama  01/11/22 9250

## 2022-01-09 NOTE — ED NOTES
Pt is being uncooperative stating he isn't doing the tests and is going to walk out of here. The pt has threatened to pull the second saline lock out when I'm not in the room. The pt agreed to leave the saline lock alone until the provider talks to him.        Faraz Padilla RN  01/09/22 3811

## 2022-01-10 PROBLEM — F32.A DEPRESSION: Status: ACTIVE | Noted: 2022-01-10

## 2022-01-10 LAB
AMPHETAMINE SCREEN, URINE: NORMAL
BARBITURATE SCREEN URINE: NORMAL
BENZODIAZEPINE SCREEN, URINE: NORMAL
CANNABINOID SCREEN URINE: NORMAL
COCAINE METABOLITE SCREEN URINE: NORMAL
ETHANOL PERCENT: 0.09 G/DL
ETHANOL: 99 MG/DL (ref 0–0.08)
GFR AFRICAN AMERICAN: >60
GFR NON-AFRICAN AMERICAN: 59
Lab: NORMAL
METHADONE SCREEN, URINE: NORMAL
OPIATE SCREEN URINE: NORMAL
OXYCODONE URINE: NORMAL
PERFORMED ON: ABNORMAL
PHENCYCLIDINE SCREEN URINE: NORMAL
POC CREATININE: 1.4 MG/DL (ref 0.9–1.3)
POC SAMPLE TYPE: ABNORMAL
PROPOXYPHENE SCREEN: NORMAL

## 2022-01-10 PROCEDURE — 6370000000 HC RX 637 (ALT 250 FOR IP): Performed by: CLINICAL NURSE SPECIALIST

## 2022-01-10 PROCEDURE — 1240000000 HC EMOTIONAL WELLNESS R&B

## 2022-01-10 PROCEDURE — 6370000000 HC RX 637 (ALT 250 FOR IP): Performed by: EMERGENCY MEDICINE

## 2022-01-10 PROCEDURE — 82077 ASSAY SPEC XCP UR&BREATH IA: CPT

## 2022-01-10 PROCEDURE — 36415 COLL VENOUS BLD VENIPUNCTURE: CPT

## 2022-01-10 PROCEDURE — 80307 DRUG TEST PRSMV CHEM ANLYZR: CPT

## 2022-01-10 PROCEDURE — 6370000000 HC RX 637 (ALT 250 FOR IP): Performed by: PSYCHIATRY & NEUROLOGY

## 2022-01-10 RX ORDER — LORAZEPAM 2 MG/ML
4 INJECTION INTRAMUSCULAR
Status: DISCONTINUED | OUTPATIENT
Start: 2022-01-10 | End: 2022-01-12 | Stop reason: HOSPADM

## 2022-01-10 RX ORDER — ONDANSETRON 4 MG/1
4 TABLET, ORALLY DISINTEGRATING ORAL EVERY 6 HOURS PRN
Status: DISCONTINUED | OUTPATIENT
Start: 2022-01-10 | End: 2022-01-12 | Stop reason: HOSPADM

## 2022-01-10 RX ORDER — LANOLIN ALCOHOL/MO/W.PET/CERES
100 CREAM (GRAM) TOPICAL DAILY
Status: DISCONTINUED | OUTPATIENT
Start: 2022-01-11 | End: 2022-01-12 | Stop reason: HOSPADM

## 2022-01-10 RX ORDER — HYDROXYZINE PAMOATE 50 MG/1
50 CAPSULE ORAL EVERY 6 HOURS PRN
Status: DISCONTINUED | OUTPATIENT
Start: 2022-01-10 | End: 2022-01-12 | Stop reason: HOSPADM

## 2022-01-10 RX ORDER — HYDROXYZINE HYDROCHLORIDE 50 MG/ML
50 INJECTION, SOLUTION INTRAMUSCULAR EVERY 6 HOURS PRN
Status: DISCONTINUED | OUTPATIENT
Start: 2022-01-10 | End: 2022-01-12 | Stop reason: HOSPADM

## 2022-01-10 RX ORDER — CHLORDIAZEPOXIDE HYDROCHLORIDE 25 MG/1
25 CAPSULE, GELATIN COATED ORAL EVERY 4 HOURS PRN
Status: DISCONTINUED | OUTPATIENT
Start: 2022-01-10 | End: 2022-01-12 | Stop reason: HOSPADM

## 2022-01-10 RX ORDER — BENZTROPINE MESYLATE 1 MG/ML
2 INJECTION INTRAMUSCULAR; INTRAVENOUS 2 TIMES DAILY PRN
Status: DISCONTINUED | OUTPATIENT
Start: 2022-01-10 | End: 2022-01-12 | Stop reason: HOSPADM

## 2022-01-10 RX ORDER — MULTIVITAMIN WITH IRON
1 TABLET ORAL DAILY
Status: DISCONTINUED | OUTPATIENT
Start: 2022-01-11 | End: 2022-01-12 | Stop reason: HOSPADM

## 2022-01-10 RX ORDER — FOLIC ACID 1 MG/1
1 TABLET ORAL DAILY
Status: DISCONTINUED | OUTPATIENT
Start: 2022-01-11 | End: 2022-01-12 | Stop reason: HOSPADM

## 2022-01-10 RX ORDER — NIFEDIPINE 30 MG/1
30 TABLET, EXTENDED RELEASE ORAL DAILY
Status: DISCONTINUED | OUTPATIENT
Start: 2022-01-10 | End: 2022-01-12 | Stop reason: HOSPADM

## 2022-01-10 RX ORDER — HALOPERIDOL 5 MG
5 TABLET ORAL EVERY 6 HOURS PRN
Status: DISCONTINUED | OUTPATIENT
Start: 2022-01-10 | End: 2022-01-12 | Stop reason: HOSPADM

## 2022-01-10 RX ORDER — HALOPERIDOL 5 MG/ML
5 INJECTION INTRAMUSCULAR EVERY 6 HOURS PRN
Status: DISCONTINUED | OUTPATIENT
Start: 2022-01-10 | End: 2022-01-12 | Stop reason: HOSPADM

## 2022-01-10 RX ORDER — POLYETHYLENE GLYCOL 3350 17 G
2 POWDER IN PACKET (EA) ORAL
Status: DISCONTINUED | OUTPATIENT
Start: 2022-01-10 | End: 2022-01-10

## 2022-01-10 RX ORDER — ACETAMINOPHEN 325 MG/1
650 TABLET ORAL EVERY 4 HOURS PRN
Status: DISCONTINUED | OUTPATIENT
Start: 2022-01-10 | End: 2022-01-12 | Stop reason: HOSPADM

## 2022-01-10 RX ORDER — GAUZE BANDAGE 2" X 2"
100 BANDAGE TOPICAL DAILY
Status: DISCONTINUED | OUTPATIENT
Start: 2022-01-10 | End: 2022-01-10 | Stop reason: SDUPTHER

## 2022-01-10 RX ORDER — CHLORDIAZEPOXIDE HYDROCHLORIDE 25 MG/1
50 CAPSULE, GELATIN COATED ORAL
Status: DISCONTINUED | OUTPATIENT
Start: 2022-01-10 | End: 2022-01-12 | Stop reason: HOSPADM

## 2022-01-10 RX ORDER — CHLORDIAZEPOXIDE HYDROCHLORIDE 25 MG/1
75 CAPSULE, GELATIN COATED ORAL
Status: DISCONTINUED | OUTPATIENT
Start: 2022-01-10 | End: 2022-01-12 | Stop reason: HOSPADM

## 2022-01-10 RX ORDER — TRAZODONE HYDROCHLORIDE 50 MG/1
50 TABLET ORAL NIGHTLY PRN
Status: DISCONTINUED | OUTPATIENT
Start: 2022-01-10 | End: 2022-01-12 | Stop reason: HOSPADM

## 2022-01-10 RX ORDER — CHLORDIAZEPOXIDE HYDROCHLORIDE 5 MG/1
10 CAPSULE, GELATIN COATED ORAL EVERY 4 HOURS PRN
Status: DISCONTINUED | OUTPATIENT
Start: 2022-01-10 | End: 2022-01-12 | Stop reason: HOSPADM

## 2022-01-10 RX ADMIN — NIFEDIPINE 30 MG: 30 TABLET, EXTENDED RELEASE ORAL at 18:40

## 2022-01-10 RX ADMIN — NICOTINE POLACRILEX 4 MG: 4 GUM, CHEWING BUCCAL at 15:00

## 2022-01-10 RX ADMIN — NICOTINE POLACRILEX 4 MG: 4 GUM, CHEWING BUCCAL at 16:36

## 2022-01-10 ASSESSMENT — SLEEP AND FATIGUE QUESTIONNAIRES
DO YOU USE A SLEEP AID: NO
DO YOU HAVE DIFFICULTY SLEEPING: NO
AVERAGE NUMBER OF SLEEP HOURS: 7

## 2022-01-10 NOTE — CARE COORDINATION
Received report from [facility] AN AFFILIATE OF [facility] staff- assigned room 375

## 2022-01-10 NOTE — ED NOTES
Provisional Diagnosis:    Depression UNSP, substance induced mood DO    Psychosocial and Contextual Factors:    Lives with his 2 roommates, one of which is his brother. C-SSRS Summary:     Patient: C-SSRS Suicide Screening  1) Within the past month, have you wished you were dead or wished you could go to sleep and not wake up? : Yes  2) Have you actually had any thoughts of killing yourself? : Yes  3) Have you been thinking about how you might kill yourself? : Yes  4) Have you had these thoughts and had some intention of acting on them? : Yes  5) Have you started to work out or worked out the details of how to kill yourself? Do you intend to carry out this plan? : Yes  6) Have you ever done anything, started to do anything, or prepared to do anything to end your life?: No  Risk of Suicide: High Risk    Family: Brothers that he lives with    [facility]          Abuse Assessment  Physical Abuse: Denies  Verbal Abuse: Denies  Emotional abuse: Denies  Financial Abuse: Denies  Sexual abuse: Denies    Clinical Summary:    27year old male presented to ED with complaints of ABD pain and suicidal ideations. Patient intoxicated on arrival, mentioned having a gun at home. Once sober patients was assessed. He denies any active SI. States he never said he was suicidal with plan to shoot self, as charting indicates. States he was asked about being suicidal, and states he answered yes, as he has been, and admits to depression. He states he was asked if he had a way to kill himself, and he admitted to having firearms in the home. He states he never had intent or plan. He admits to depression and daily drinking. He admits while he may be interested in quitting drinking, but does not want to talk to [facility], will quit on his own if he wants. He states there are a total of 30 guns in the home, all that are usually in a gun safe, which he has acces too. Collateral from patients brother Riley Richard.  He believes patient main issue is drinking, and doesn't believe he is a danger to himself. However, he is unsure about being able to remove all firearms out of the house, worried about safe external storage.     Level of Care Disposition:      Per Dr Marian Burk, RN  01/10/22 Damien Morocho., RN  01/10/22 6839

## 2022-01-10 NOTE — ED NOTES
Pt attempting to stand at bedside. Unsteady on his feet. Placed into gown, given booties, and reoriented back into bed. Provided with warm blankets, bed in low position, side rails x2 in place. PD inventoried and removed pt belongings from room.       Nicole Crabtree RN  01/09/22 2108       Nicole Crabtree RN  01/09/22 2109

## 2022-01-10 NOTE — PROGRESS NOTES
Admission assessment complete. Pt came in the ER intoxicated and made suicidal statements. Pt states he is not currently suicidal and attributes his comments to being intoxicated. Pt states he has struggled with depression since childhood. Pt grew up with his dad and other family members in [place]. Pt's mother left when he was born and pt believes this has attributed to his depression. 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 Denies having any flashbacks or night terrors. Pt states he has been drinking since he was 17 and states he drinks \"because I like it. \" States he was on Effexor at one point and it was effective but stopped taking it d/t his drinking. Past history of substance abuse but has been clean for several years. Pt works as a  and lives with a friend currently. Pt states he is going to have his own house in about a month. Pt is  and has 3 children whom he does not see. Pt denies any past suicide attempts. Denies any self harm. Denies current SI, HI, AVH. No previous inpatient treatments. Pt has good eye contact and thought process.

## 2022-01-10 NOTE — ED NOTES
Pt resting with eyes closed in ED cot no s/s of distress noted.         Jennifer Sylvester RN  01/10/22 2106

## 2022-01-10 NOTE — GROUP NOTE
Group Therapy Note    Date: 1/10/2022    Group Start Time: 2210  Group End Time: 2295  Group Topic: Healthy Living/Wellness    [facility] 3[facility] CAYLAI    Cheryle Notice        Group Therapy Note    Attendees: 14/20         Patient's Goal:  To learn about and practice positivity. Notes:  Patient was an active listener in group. Status After Intervention:  Unchanged    Participation Level:  Active Listener    Participation Quality: Appropriate and Attentive      Speech:  hesitant      Thought Process/Content: Logical      Affective Functioning: Flat      Mood: euthymic      Level of consciousness:  Alert and Attentive      Response to Learning: Progressing to goal      Endings: None Reported    Modes of Intervention: Education      Discipline Responsible: [address] [facility]      Signature:  Cheryle Notice

## 2022-01-10 NOTE — PROGRESS NOTES
Pt is found in a meditation pose sitting on his bed, offered and gave nict gum, pt reports he is ready to give up alcohol ,stated Im done with it, I can quit w/o withdraw, there is none noted, no tremors,sweats noted. Pt is pleasant.

## 2022-01-10 NOTE — ED NOTES
Pt resting in Ed cot resp even and unlabored, skin pwd.       Jennifer Sylvester, HAI  01/10/22 4445

## 2022-01-10 NOTE — ED NOTES
With assistance from security and Jose Dee RN the pt was medicated to calm him down. The pt realized he was out numbered and did not resist the injections. The pt remained standing at the door complaining to security and still refusing to cooperate with treatment.       Vlad Coley RN  01/10/22 6763

## 2022-01-10 NOTE — ED NOTES
Explained to patient will be getting admitted for General acute hospital reason, concern of safety of firearms.      Rosaura Alfaro RN  01/10/22 5490

## 2022-01-11 PROCEDURE — 6370000000 HC RX 637 (ALT 250 FOR IP): Performed by: CLINICAL NURSE SPECIALIST

## 2022-01-11 PROCEDURE — 99223 1ST HOSP IP/OBS HIGH 75: CPT | Performed by: PSYCHIATRY & NEUROLOGY

## 2022-01-11 PROCEDURE — 6370000000 HC RX 637 (ALT 250 FOR IP): Performed by: PSYCHIATRY & NEUROLOGY

## 2022-01-11 PROCEDURE — 1240000000 HC EMOTIONAL WELLNESS R&B

## 2022-01-11 RX ORDER — VENLAFAXINE HYDROCHLORIDE 75 MG/1
75 CAPSULE, EXTENDED RELEASE ORAL
Status: DISCONTINUED | OUTPATIENT
Start: 2022-01-12 | End: 2022-01-12 | Stop reason: HOSPADM

## 2022-01-11 RX ADMIN — NICOTINE POLACRILEX 4 MG: 4 GUM, CHEWING BUCCAL at 18:37

## 2022-01-11 RX ADMIN — NICOTINE POLACRILEX 4 MG: 4 GUM, CHEWING BUCCAL at 12:41

## 2022-01-11 RX ADMIN — NIFEDIPINE 30 MG: 30 TABLET, EXTENDED RELEASE ORAL at 09:34

## 2022-01-11 RX ADMIN — Medication 100 MG: at 09:34

## 2022-01-11 RX ADMIN — FOLIC ACID 1 MG: 1 TABLET ORAL at 09:34

## 2022-01-11 RX ADMIN — THERA TABS 1 TABLET: TAB at 09:34

## 2022-01-11 RX ADMIN — NICOTINE POLACRILEX 4 MG: 4 GUM, CHEWING BUCCAL at 09:34

## 2022-01-11 ASSESSMENT — LIFESTYLE VARIABLES: HISTORY_ALCOHOL_USE: YES

## 2022-01-11 NOTE — CARE COORDINATION
Psychosocial Assessment    Current Level of Psychosocial Functioning     Independent X  Dependent    Minimal Assist     Comments:  Resides with two roommates,  works full time     Psychosocial High Risk Factors (check all that apply)    Unable to obtain meds   Chronic illness/pain    Substance abuse X  Lack of Family Support X  Financial stress   Isolation   Inadequate Community Resources X   Suicide attempt(s)  Not taking medications   Victim of crime   Developmental Delay  Unable to manage personal needs    Age 72 or older   Homeless  No transportation   Readmission within 30 days  Unemployment  Traumatic Event    Family/Supports identified:  Irma Pineda, roommate, [phone]    Sexual Orientation:  Did not disclose    Patient Strengths: employment, housing     Patient Barriers: lack of insight. Lack of med compliance     Safety plan: completed     CMHC/MH history: reports past rehab admission in [place]. ptsd from two deployments. Plan of Care:  medication management, group/individual therapies, family meetings, psycho -education, treatment team meetings to assist with stabilization    Initial Discharge Plan:  Gather collateral and discuss with tx team.     Clinical Summary:  Pt is a 27year old male who presented to the ER intoxication after discussing SI with access to weapons. Reports daily drinking and denies LGR. Pt reports thinking of SI was impulsive when he saw the firearm. Pt is denying all symptoms. Reports he does not need to be admitted. Pt reports that he was intoxicated and answered the questions wrong due to being drunk. Pt denies SI/HI AVH. Guns need to be secured prior to pt discharging. [facility] Reports having a counselor and prescriber but hasnt seen them in 6 months. Reports ptsd from deployment to [place] twice. 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  Electronically signed by ELLA Shah on 1/11/2022 at 4:03 PM

## 2022-01-11 NOTE — PROGRESS NOTES
Pt is resting in bed now, appears calm and is reading a book, pt was offered vistaril and declined, pt denies any withdraw , no tremors or sweats noted. Gave nict gum .

## 2022-01-11 NOTE — PROGRESS NOTES
Pt noted in group now, explained all am meds, pt reports he is not in withdraw, no tremors or sweats noted. Pt denied anxiety.

## 2022-01-11 NOTE — H&P
[address] - Department of Psychiatry    History and Physical - Adult         CHIEF COMPLAINT:  Depression SI      History obtained from:  patient    Patient was seen after discussing with the treatment team and reviewing the chart        CIRCUMSTANCES OF ADMISSION:     27year old male presented to ED with complaints of ABD pain and suicidal ideations. Patient intoxicated on arrival, mentioned having a gun at home. Once sober patients was assessed. He denies any active SI. States he never said he was suicidal with plan to shoot self, as charting indicates. States he was asked about being suicidal, and states he answered yes, as he has been, and admits to depression. He states he was asked if he had a way to kill himself, and he admitted to having firearms in the home. He states he never had intent or plan. He admits to depression and daily drinking. He admits while he may be interested in quitting drinking, but does not want to talk to [facility], will quit on his own if he wants. He states there are a total of 30 guns in the home, all that are usually in a gun safe, which he has acces too.     Collateral from patients brother Florinda Sánchez. He believes patient main issue is drinking, and doesn't believe he is a danger to himself. However, he is unsure about being able to remove all firearms out of the house, worried about safe external storage.       HISTORY OF PRESENT ILLNESS:      The patient is a 27 y.o. male single live with 2 brothers, employed-  with significant past history of PTSD on venlafaxine, quit 3 weeks ago. Pt has been drinking a bottle of Rum a day for many weeks. No active withdrawal. Pt had severe upper abd pain yesterday when he got drunk. Pt has had alcoholism issues in the past,quit for 4 years, relapsed middle last year. No major stressor  No drugs or other addiction. Pt was in severe pain while intoxicated for his brother to call 911.   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 Medications Prior to Admission:   Medications Prior to Admission: ibuprofen (ADVIL;MOTRIN) 800 MG tablet, Take 1 tablet by mouth every 8 hours as needed for Pain or Fever    Compliance:no    Psychiatric Review of Systems       Depression: denies     Yasmin or Hypomania:  no     Panic Attacks:  no     Phobias:  no     Obsessions and Compulsions:  no     PTSD : yes     Hallucinations:  no     Delusions:  no    Substance Abuse History:  ETOH: yes   Marijuana: no  Opiates: no  Other Drugs: no  Been to rehab in the past was sober for 4 years    Past Psychiatric History:  Prior Diagnosis:  PTSD, ALcohol use disorder  Psychiatrist: No  Therapist:no  Hospitalization: no  Hx of Suicidal Attempts: no  Hx of violence:  no  ECT: no  Previous discontinued Psychiatric Med Trials: effexor    Past Medical History:        Diagnosis Date    Alcohol abuse     Depression 1/10/2022       Past Surgical History:    History reviewed. No pertinent surgical history. Allergies:   Patient has no known allergies. Family History  History reviewed. No pertinent family history. Social History:  Born and Raised: [place]  Describes Childhood:   supportive  Education: [employer]  Employment: Employed full time  Relationships: single  Children: no children  Current Support: extended family    Legal Hx: none  Access to weapons?:  No      EXAMINATION:    REVIEW OF SYSTEMS:    ROS:  [x] All negative/unchanged except if checked.  Explain positive(checked items) below:  [] Constitutional  [] Eyes  [] Ear/Nose/Mouth/Throat  [] Respiratory  [] CV  [] GI  []   [] Musculoskeletal  [] Skin/Breast  [] Neurological  [] Endocrine  [] Heme/Lymph  [] Allergic/Immunologic    Explanation: Vitals:  BP (!) 149/104 Comment: RN Notified  Pulse 96   Temp 98.8 °F (37.1 °C) (Oral)   Resp 18   Ht 5' 6\" (1.676 m)   Wt 200 lb (90.7 kg)   SpO2 99%   BMI 32.28 kg/m²      Neurologic Exam:   Muscle Strength & Tone: full ROM  Gait: normal gait   Involuntary Movements: No    Mental Status Examination:    Level of consciousness:  within normal limits   Appearance:  well-appearing  Behavior/Motor:  no abnormalities noted  Attitude toward examiner:  cooperative  Speech:  slow   Mood: anxious  Affect:  mood congruent  Thought processes:  linear   Thought content:  Suicidal Ideation:  denies suicidal ideation  Delusions:  no evidence of delusions  Perceptual Disturbance:  denies any perceptual disturbance  Cognition:  oriented to person, place, and time   Concentration intact  Memory intact  Insight fair   Judgement fair   Fund of Knowledge adequate    Mini Mental Status 30/30      DIAGNOSIS:   SIMD   Alcohol use disorder severe dependence   PTSD      RISK ASSESSMENT:    SUICIDE RISK ASSESSMENT: moderate  HOMICIDE: low  AGITATION/VIOLENCE: low  ELOPEMENT: low    LABS: REVIEWED TODAY:  Recent Labs     01/09/22  1700   WBC 5.5   HGB 15.1        Recent Labs     01/09/22  1700 01/09/22  1754     --    K 4.7  --      --    CO2 25  --    BUN 9  --    CREATININE 1.02 1.4*   GLUCOSE 110*  --      Recent Labs     01/09/22  1700   BILITOT <0.2   ALKPHOS 91   AST 34   ALT 29     Lab Results   Component Value Date    LABAMPH Neg 01/10/2022    BARBSCNU Neg 01/10/2022    LABBENZ Neg 01/10/2022    LABMETH Neg 01/10/2022    OPIATESCREENURINE Neg 01/10/2022    PHENCYCLIDINESCREENURINE Neg 01/10/2022    ETOH 99 01/10/2022     Lab Results   Component Value Date    TSH 1.310 01/09/2022     No results found for: LITHIUM  No results found for: VALPROATE, CBMZ  No results found for: LITHIUM, VALPROATE    FURTHER LABS ORDERED :      Radiology   No results found.     EKG: TRACING REVIEWED    TREATMENT PLAN:    Risk Management:  suicide risk    Collateral Information:  Will obtain collateral information from the family or friends. Will obtain medical records as appropriate from out patient providers  Will consult the hospitalist for a physical exam to rule out any co-morbid physical condition. Home medication Reconciled       New Medications started during this admission :    See orders  Prn Haldol 5mg and Vistaril 50mg q6hr for extreme agitation. Trazodone as ordered for insomnia  Vistaril as ordered for anxiety  Discussed with the patient risk, benefit, alternative and common side effects for the  proposed medication treatment. Patient is consenting to the treatment.     Psychotherapy:   Encourage participation in milieu and group therapy  Individual therapy as needed      Electronically signed by Diane Parrish MD on 1/11/2022 at 9:55 AM

## 2022-01-11 NOTE — CARE COORDINATION
Brief Intervention and Referral to Treatment Summary    Patient was provided PHQ-9, AUDIT and DAST Screening:      PHQ-9 Score: nc  AUDIT Score:  30  DAST Score:  0    Patients substance use is considered     Low Risk/Healthy  Moderate Risk  Harmful  Dependent X     Patients depression is considered:  NC     Minimal  Mild   Moderate  Moderately Severe  Severe    Brief Education Was Provided    Patient was receptive  Patient was not receptive X       Brief Intervention Is Provided (Only for AUDIT or DAST)     Patient reports readiness to decrease and/or stop use and a plan was discussed   Patient denies readiness to decrease and/or stop use and a plan was not discussed X    Recommendations/Referrals for Brief and/or Specialized Treatment Provided to Patient     Pt reports drinking a bottle of liquor from 430pm-8pm daily. PT reports he recently had pain while drinking and he wants to stop drinking due to the recent pain he had.  Pt denied LGR referral. Electronically signed by Corine Meckel , LISW-S on 1/11/2022 at 4:05 PM

## 2022-01-11 NOTE — PROGRESS NOTES
Behavioral Services  Medicare Certification Upon Admission    I certify that this patient's inpatient psychiatric hospital admission is medically necessary for:    [x] (1) Treatment which could reasonably be expected to improve this patient's condition,       [x] (2) Or for diagnostic study;     AND     [x](2) The inpatient psychiatric services are provided while the individual is under the care of a physician and are included in the individualized plan of care.     Estimated length of stay/service 3-5 days    Plan for post-hospital care OP care    Electronically signed by Noelle Watkins MD on 1/11/2022 at 9:55 AM

## 2022-01-11 NOTE — PROGRESS NOTES
Patient was in his room, he was awake and alert. Patient was agreeable to do his leisure assessment. He was pleasant and cooperative. Patient stated he was drinking alcohol and made a  suicidal statement. Patient stated he was drunk and never felt suicidal.  He drinks alcohol daily. He has been off his medication for 3 weeks and is depressed but stated he been depressed for awhile. He does not want any help with his alcohol use, stating \"I can quit on my own. \"  Patient denies any auditory or visual hallucinations. He has a good appetite and is sleeping well. Patient denies the need to be here and wants to go home. He enjoys music, cooking and watching You Tube.  Electronically signed by Rafi Truong, [address] on 1/11/2022 at 7:59 AM

## 2022-01-11 NOTE — PROGRESS NOTES
Patient did not attend wellness group despite encouragement by staff. Electronically signed by Jasper Diallo on 1/11/2022 at 2:45 PM

## 2022-01-11 NOTE — PROGRESS NOTES
Morning Community Meeting Topics    Watson Calero attended the morning community meeting on 1/11/22. Topics discussed today     [x] Introduction   Day of the week and date   Mask distribution   Current mask requirements  [x]Teams   Explanation of  Green and Blue team criteria   Nurses assigned to each team for today   Explanation about green and blue paper  o Date  o Patient's Name  o Patient's Nurse  o Goals  [x] Visitation   Announce the visiting hours for the day   Announce which team is allowed to have visitors for the day   Review any updated Covid 19 requirements for visitors during visitation  o Vaccine Card or negative Covid test within 48 hours of visit  o State Identification   Patients are reminded to alert the  at least 1 hour before visitation   [x] Unit Orientation   Coffee use   Phone location and etiquette   Shower locations  [employer] and dryer location and process   Common area expectations   Staff rounds expectation  [x] Meals    Educate patient to the menu  o The patient is encouraged to fill out the menu to get preferences at mealtime  o The patient is educated that if they do not fill out the menu, they will get the standard tray  o The coffee pot is decaf, patient encouraged to order regular coffee from menu.    Educate patient to the meal process   Patient encouraged to eat snacks provided twice daily  o Snacks may stay in patient room     [x] Discharge Process   Discharge expectations   Fill out the survey after discharge   [x] Hygiene   Daily showers encouraged  o Showers availability discussed    Daily dressing encouraged  o Discussed wearing street clothing   Education provided on where to place linens and clothing  o Linens in the hamper  o personal clothing does not go into the linen hamper  [x] Group    Patient encouraged to attend group provided   Time of Group Meetings discussed   Gentle reminder that attendance is a Physician order  [x] Movement   Chair exercises completed   Stretching completed  Notes:Goal - \"Talk to the Doctor and go home\" Electronically signed by CLAUDIA Smith on 1/11/2022 at 1:42 PM

## 2022-01-11 NOTE — PROGRESS NOTES
Pt. refused to attend the 1000 skills group, despite staff encouragement. Electronically signed by Justin Dutta, [address] on 1/11/2022 at 1:49 PM

## 2022-01-11 NOTE — CARE COORDINATION
FAMILY COLLATERAL NOTE    Family/Support Name: arnav  Contact #: [phone]  Relationship to Pt[de-identified] roommate         Family/Support contact aware of hospitalization: yes    Presenting Symptoms/Current Concerns:  Presented to ER intoxicated for stomach pains and reported he was suicidal with plan to shoot self with firearm. Pt reports having access to 30 firearms at home. Top 3 Life Stressors:   Alcoholism  Not taking his medications     Background History Relevant to Current Hospitalization:  Reports when pt is on his meds, he does alright but he drinks daily and struggles with alcoholism. Reports his medications help him stay more stable. Pt went to the hospital for stomach pain and he was \"very wasted and started drinking at 730am.\" Reports pt ate 6 ghost peppers back to back and after eating them, he started screaming how his stomach was in so much pain and went to the hospital. Reports came to hospital intoxicated and began to discuss suicide. Reports when pt is taking his medications, he does not talking about suicide even when drinking. Believes pt did not have intention to complete suicide and believes it was due to the alcohol use. SI is secondary to alcoholism. from Saturday to Sunday afternoon, he drank 2.5 bottles of crown royal.   Family Mental Health/Substance Use History:    family has history of alcoholism       Support Network's Goal for Hospitalization: \"I would like him to take his medications and stop drinking. It has been a problem for years. \"    Discharge Plan: discharge home and link with [address] [place] [address]. Support Network Supportive of Discharge Plan: in agreement       Support can confirm Safety of Location and Security of Weapons:  Reports he went through the entire house and placed all firearms in his own gun safe that pt has no access to.        Support agreeable to Safeguard and Monitor Medications (including Prescription and OTC): does not believe pt will take his medications. Identified Barriers to Compliance with Discharge Plan:  Alcoholism, ptsd. Recommendations for Support Network: be available for follow up.          ELLA Armas

## 2022-01-11 NOTE — PROGRESS NOTES
Patient isolates to his room and reads. He presents calm and cooperative. He wishes he did not need to be here but expresses desire to make the best of it. He reports he has been depressed for many years. Patient had not taken medication and choose to drink. Now he reports fear related to the abdominal pain that brought him to the hospital. He feels more inspired to quit the alcohol. No SI, HI, or hallucinations. No evidence of any withdrawals.

## 2022-01-11 NOTE — PROGRESS NOTES
Patient isolates to his room. He is calm and cooperative. He admits to some depression but denies SI, HI, or hallucinations.

## 2022-01-11 NOTE — GROUP NOTE
Group Therapy Note    Date: 1/10/2022    Group Start Time: 1900  Group End Time: 1955  Group Topic: Recreational    [facility] I    Freddy Corona        Group Therapy Note    Attendees: 13/21         Patient's Goal:  To watch a movie and socialize with peers. Notes:  Patient participated in activity group.     Status After Intervention:  Improved    Participation Level: Interactive    Participation Quality: Appropriate and Attentive      Speech:  normal      Thought Process/Content: Logical      Affective Functioning: Congruent      Mood: euthymic      Level of consciousness:  Alert and Attentive      Response to Learning: Progressing to goal      Endings: None Reported    Modes of Intervention: Activity      Discipline Responsible: [employer]      Signature:  Freddy Corona

## 2022-01-12 VITALS
BODY MASS INDEX: 32.14 KG/M2 | HEIGHT: 66 IN | SYSTOLIC BLOOD PRESSURE: 134 MMHG | DIASTOLIC BLOOD PRESSURE: 101 MMHG | HEART RATE: 102 BPM | RESPIRATION RATE: 18 BRPM | OXYGEN SATURATION: 97 % | WEIGHT: 200 LBS | TEMPERATURE: 98.3 F

## 2022-01-12 PROCEDURE — 6370000000 HC RX 637 (ALT 250 FOR IP): Performed by: PSYCHIATRY & NEUROLOGY

## 2022-01-12 PROCEDURE — 99239 HOSP IP/OBS DSCHRG MGMT >30: CPT | Performed by: PSYCHIATRY & NEUROLOGY

## 2022-01-12 PROCEDURE — 6370000000 HC RX 637 (ALT 250 FOR IP): Performed by: CLINICAL NURSE SPECIALIST

## 2022-01-12 RX ORDER — NIFEDIPINE 30 MG/1
30 TABLET, EXTENDED RELEASE ORAL DAILY
Qty: 30 TABLET | Refills: 3 | Status: SHIPPED | OUTPATIENT
Start: 2022-01-13

## 2022-01-12 RX ORDER — MULTIVITAMIN WITH IRON
1 TABLET ORAL DAILY
Qty: 30 TABLET | Refills: 1 | Status: SHIPPED | OUTPATIENT
Start: 2022-01-13

## 2022-01-12 RX ORDER — VENLAFAXINE HYDROCHLORIDE 75 MG/1
75 CAPSULE, EXTENDED RELEASE ORAL
Qty: 15 CAPSULE | Refills: 3 | Status: SHIPPED | OUTPATIENT
Start: 2022-01-13

## 2022-01-12 RX ADMIN — Medication 100 MG: at 09:32

## 2022-01-12 RX ADMIN — FOLIC ACID 1 MG: 1 TABLET ORAL at 09:32

## 2022-01-12 RX ADMIN — THERA TABS 1 TABLET: TAB at 09:32

## 2022-01-12 RX ADMIN — NICOTINE POLACRILEX 4 MG: 4 GUM, CHEWING BUCCAL at 09:48

## 2022-01-12 RX ADMIN — NIFEDIPINE 30 MG: 30 TABLET, EXTENDED RELEASE ORAL at 09:32

## 2022-01-12 RX ADMIN — VENLAFAXINE HYDROCHLORIDE 75 MG: 75 CAPSULE, EXTENDED RELEASE ORAL at 09:32

## 2022-01-12 NOTE — GROUP NOTE
Group Therapy Note    Date: 1/12/2022    Group Start Time: 1110  Group End Time: 5327  Group Topic: Psychotherapy    MLMARCI 3W CAYLAI    Elizabeth Arango, [facility]        Group Therapy Note    Attendees:7         Patient's Goal: to start back to work tomorrow    Notes:  Patient stated that he needs a RTW note    Status After Intervention:  Improved    Participation Level: Interactive    Participation Quality: Appropriate      Speech:  normal      Thought Process/Content: Logical      Affective Functioning: Congruent      Mood: anxious      Level of consciousness:  Alert      Response to Learning: Progressing to goal      Endings: None Reported    Modes of Intervention: Support      Discipline Responsible: /Counselor      Signature:  Jenae Gilmore, [facility]

## 2022-01-12 NOTE — PROGRESS NOTES
Patient did not attend group despite staff encouragement.   Electronically signed by Shari Rushing on 1/11/2022 at 10:15 PM

## 2022-01-12 NOTE — DISCHARGE SUMMARY
DISCHARGE SUMMARY      Patient ID:  Carol Woody  [id number]  4 y.o.  1990      Admit date: 1/9/2022    Discharge date and time: 1/12/2022    Admitting Physician: Annie Mason MD     Discharge Physician: Dr Carmina Cárdenas MD    Admission Diagnoses: Recurrent major depressive disorder, remission status unspecified ([facility] [address] [F33.9]  Depression, unspecified depression type [F32. A]    Admission Condition: poor    Discharged Condition: stable    Admission Circumstance:     27year old male presented to ED with complaints of ABD pain and suicidal ideations. Patient intoxicated on arrival, mentioned having a gun at home. Once sober patients was assessed. He denies any active SI. States he never said he was suicidal with plan to shoot self, as charting indicates. States he was asked about being suicidal, and states he answered yes, as he has been, and admits to depression. He states he was asked if he had a way to kill himself, and he admitted to having firearms in the home. He states he never had intent or plan. He admits to depression and daily drinking. He admits while he may be interested in quitting drinking, but does not want to talk to [facility], will quit on his own if he wants. He states there are a total of 30 guns in the home, all that are usually in a gun safe, which he has acces too.     Collateral from patients brother Alexandra Greer. He believes patient main issue is drinking, and doesn't believe he is a danger to himself. However, he is unsure about being able to remove all firearms out of the house, worried about safe external storage.        HISTORY OF PRESENT ILLNESS:       The patient is a 27 y.o. male single live with 2 brothers, employed-  with significant past history of PTSD on venlafaxine, quit 3 weeks ago. Pt has been drinking a bottle of Rum a day for many weeks. No active withdrawal. Pt had severe upper abd pain yesterday when he got drunk.  Pt has had alcoholism issues in the past,quit for 4 years, relapsed middle last year. No major stressor  No drugs or other addiction. Pt was in severe pain while intoxicated for his brother to call 911. 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     Medications Prior to Admission:   Prescriptions Prior to Admission   Medications Prior to Admission: ibuprofen (ADVIL;MOTRIN) 800 MG tablet, Take 1 tablet by mouth every 8 hours as needed for Pain or Fever        Compliance:no     Psychiatric Review of Systems       Depression: denies     Yasmin or Hypomania:  no     Panic Attacks:  no     Phobias:  no     Obsessions and Compulsions:  no     PTSD : yes     Hallucinations:  no     Delusions:  no     Substance Abuse History:  ETOH: yes   Marijuana: no  Opiates: no  Other Drugs: no  Been to rehab in the past was sober for 4 years     Past Psychiatric History:  Prior Diagnosis:  PTSD, ALcohol use disorder  Psychiatrist: No  Therapist:no  Hospitalization: no  Hx of Suicidal Attempts: no  Hx of violence:  no  ECT: no  Previous discontinued Psychiatric Med Trials: effexor        PAST MEDICAL/PSYCHIATRIC HISTORY:   Past Medical History:   Diagnosis Date    Alcohol abuse     Depression 1/10/2022       FAMILY/SOCIAL HISTORY:  History reviewed. No pertinent family history. Social History     Socioeconomic History    Marital status:      Spouse name: Not on file    Number of children: Not on file    Years of education: Not on file    Highest education level: Not on file   Occupational History    Not on file   Tobacco Use    Smoking status: Heavy Tobacco Smoker    Smokeless tobacco: Never Used   Vaping Use    Vaping Use: Never used   Substance and Sexual Activity    Alcohol use: Yes    Drug use:  Yes    Sexual activity: Yes   Other Topics Concern    Not on file   Social History Narrative    Not on file     Social Determinants of Health     Financial Resource Strain:     Difficulty of Paying Living Expenses: Not on file   Food Insecurity:     Worried About Running Out of Food in the Last Year: Not on file    Mónica of Food in the Last Year: Not on file   Transportation Needs:     Lack of Transportation (Medical): Not on file    Lack of Transportation (Non-Medical):  Not on file   Physical Activity:     Days of Exercise per Week: Not on file    Minutes of Exercise per Session: Not on file   Stress:     Feeling of Stress : Not on file   Social Connections:     Frequency of Communication with Friends and Family: Not on file    Frequency of Social Gatherings with Friends and Family: Not on file    Attends [employer]ist Services: Not on file    Active Member of [address] [place] [address] [employer] or Organizations: Not on file    Attends Club or Organization Meetings: Not on file    Marital Status: Not on file   Intimate Partner Violence:     Fear of Current or Ex-Partner: Not on file    Emotionally Abused: Not on file    Physically Abused: Not on file    Sexually Abused: Not on file   Housing Stability:     Unable to Pay for Housing in the Last Year: Not on file    Number of [place] in the Last Year: Not on file    Unstable Housing in the Last Year: Not on file       MEDICATIONS:    Current Facility-Administered Medications:     venlafaxine (EFFEXOR XR) extended release capsule 75 mg, 75 mg, Oral, Daily with breakfast, Jose Pereira MD, 75 mg at 01/12/22 0932    haloperidol lactate (HALDOL) injection 5 mg, 5 mg, IntraMUSCular, Q6H PRN **OR** haloperidol (HALDOL) tablet 5 mg, 5 mg, Oral, Q6H PRN, Jose Pereira MD    hydrOXYzine (VISTARIL) capsule 50 mg, 50 mg, Oral, Q6H PRN **OR** hydrOXYzine (VISTARIL) injection 50 mg, 50 mg, IntraMUSCular, Q6H PRN, Jose Pereira MD    acetaminophen (TYLENOL) tablet 650 mg, 650 mg, Oral, Q4H PRN, Musa Gold MD    traZODone (DESYREL) tablet 50 mg, 50 mg, Oral, Nightly PRN, Musa Gold MD    benztropine mesylate (COGENTIN) injection 2 mg, 2 mg, IntraMUSCular, BID PRN, Musa Gold MD    magnesium hydroxide (MILK OF MAGNESIA) 400 MG/5ML suspension 30 mL, 30 mL, Oral, Daily PRN, Musa Gold MD    chlordiazePOXIDE (LIBRIUM) capsule 10 mg, 10 mg, Oral, Q4H PRN **OR** chlordiazePOXIDE (LIBRIUM) capsule 25 mg, 25 mg, Oral, Q4H PRN **OR** chlordiazePOXIDE (LIBRIUM) capsule 50 mg, 50 mg, Oral, Q2H PRN **OR** chlordiazePOXIDE (LIBRIUM) capsule 75 mg, 75 mg, Oral, Q1H PRN **OR** LORazepam (ATIVAN) injection 4 mg, 4 mg, IntraMUSCular, Q1H PRN, Musa Gold MD    folic acid (FOLVITE) tablet 1 mg, 1 mg, Oral, Daily, Musa Gold MD, 1 mg at 01/12/22 0932    multivitamin 1 tablet, 1 tablet, Oral, Daily, Musa Gold MD, 1 tablet at 01/12/22 0932    ondansetron (ZOFRAN-ODT) disintegrating tablet 4 mg, 4 mg, Oral, Q6H PRN, Musa Gold MD    thiamine tablet 100 mg, 100 mg, Oral, Daily, Musa Gold MD, 100 mg at 01/12/22 0932    nicotine polacrilex (NICORETTE) gum 4 mg, 4 mg, Oral, Q2H PRN, Musa Gold MD, 4 mg at 01/11/22 1837    NIFEdipine (PROCARDIA XL) extended release tablet 30 mg, 30 mg, Oral, Daily, Donna Osorio, SILVANA - CNS, 30 mg at 01/12/22 0932    influenza quadrivalent split vaccine (FLUZONE;FLUARIX;FLULAVAL;AFLURIA) injection 0.5 mL, 0.5 mL, IntraMUSCular, Prior to discharge, Musa oGld MD    Examination:  BP (!) 134/101   Pulse 102   Temp 98.3 °F (36.8 °C) (Oral)   Resp 18   Ht 5' 6\" (1.676 m)   Wt 200 lb (90.7 kg)   SpO2 97%   BMI 32.28 kg/m²   Gait - steady    HOSPITAL COURSE[de-identified]  Following admission to the hospital, patient had a complete physical exam and blood work up  Patient was monitored closely with suicide precaution  Patient was started on meds as listed below  Was encouraged to participate in group and other milieu activity  Patient started to feel better with this combination of treatment. Significant progress in the symptoms since admission. Mood better, with the score of 2/10 - bad  No AVH or paranoid thoughts  No Hopeless or worthless feeling  No active SI/HI  Appetite:  [x] Normal  [] Increased  [] Decreased    Sleep:       [x] Normal  [] Fair       [] Poor            Energy:    [x] Normal  [] Increased  [] Decreased     SI [] Present  [x] Absent  HI  []Present  [x] Absent   Aggression:  [] yes  [] no  Patient is [x] able  [] unable to CONTRACT FOR SAFETY   Medication side effects(SE):  [x] None(Psych.  Meds.) [] Other      Mental Status Examination on discharge:    Level of consciousness:  within normal limits   Appearance:  well-appearing  Behavior/Motor:  no abnormalities noted  Attitude toward examiner:  attentive and good eye contact  Speech:  spontaneous, normal rate and normal volume   Mood: euthymic  Affect:  mood congruent  Thought processes:  goal directed   Thought content:  Suicidal Ideation:  denies suicidal ideation  Delusions:  no evidence of delusions  Perceptual Disturbance:  denies any perceptual disturbance  Cognition:  oriented to person, place, and time   Concentration intact  Memory intact  Insight good   Judgement fair   Fund of Knowledge adequate      ASSESSMENT:  Patient symptoms are:  [x] Well controlled  [x] Improving  [] Worsening  [] No change      Diagnosis:  DIAGNOSIS:   SIMD   Alcohol use disorder severe dependence   PTSD    LABS:    Recent Labs     01/09/22  1700   WBC 5.5   HGB 15.1        Recent Labs     01/09/22  1700 01/09/22  1754     --    K 4.7  --      --    CO2 25  --    BUN 9  --    CREATININE 1.02 1.4*   GLUCOSE 110*  --      Recent Labs     01/09/22  1700   BILITOT <0.2   ALKPHOS 91   AST 34   ALT 29     Lab Results   Component Value Date    LABAMPH Neg 01/10/2022    BARBSCNU Neg 01/10/2022    LABBENZ Neg 01/10/2022    LABMETH Neg 01/10/2022 OPIATESCREENURINE Neg 01/10/2022    PHENCYCLIDINESCREENURINE Neg 01/10/2022    ETOH 99 01/10/2022     Lab Results   Component Value Date    TSH 1.310 01/09/2022     No results found for: LITHIUM  No results found for: VALPROATE, CBMZ    RISK ASSESSMENT AT DISCHARGE: Low risk for suicide and homicide. Treatment Plan:  Reviewed current Medications with the patient. Education provided on the complaince with treatment. Risks, benefits, side effects, drug-to-drug interactions and alternatives to treatment were discussed. Encourage patient to attend outpatient follow up appointment and therapy. Patient was advised to call the outpatient provider, visit the nearest ED or call 911 if symptoms are not manageable. Patient's family member was contacted prior to the discharge.          Medication List      START taking these medications    multivitamin Tabs tablet  Take 1 tablet by mouth daily  Start taking on: January 13, 2022     venlafaxine 75 MG extended release capsule  Commonly known as: EFFEXOR XR  Take 1 capsule by mouth daily (with breakfast)  Start taking on: January 13, 2022        STOP taking these medications    ibuprofen 800 MG tablet  Commonly known as: ADVIL;MOTRIN           Where to Get Your Medications      These medications were sent to [address] [address] [facility]  [address] [address] , [facility] [place] [address]    Phone: [phone]   · multivitamin Tabs tablet  · venlafaxine 75 MG extended release capsule           Reason for more than one antipsychotic:   [x] N/A  [] 3 failed monotherapy(drugs tried):  [] Cross over to a new antipsychotic  [] Taper to monotherapy from polypharmacy  [] Augmentation of Clozapine therapy due to treatment resistance to single therapy        TIME SPEND - 35 MINUTES TO COMPLETE THE EVALUATION, DISCHARGE SUMMARY, MEDICATION RECONCILIATION AND FOLLOW UP CARE     Signed:  Su Rivera MD  1/12/2022  9:43 AM

## 2022-01-12 NOTE — PROGRESS NOTES
Morning Community Meeting Topics    Domenica Abraham attended the morning community meeting on 1/12/22. Topics discussed today     [x] Introduction   Day of the week and date   Mask distribution   Current mask requirements  [x]Teams   Explanation of  Green and Blue team criteria   Nurses assigned to each team for today   Explanation about green and blue paper  o Date  o Patient's Name  o Patient's Nurse  o Goals  [x] Visitation   Announce the visiting hours for the day   Announce which team is allowed to have visitors for the day   Review any updated Covid 19 requirements for visitors during visitation  o Vaccine Card or negative Covid test within 48 hours of visit  o State Identification   Patients are reminded to alert the  at least 1 hour before visitation   [x] Unit Orientation   Coffee use   Phone location and etiquette   Shower locations  [employer] and dryer location and process   Common area expectations   Staff rounds expectation  [x] Meals    Educate patient to the menu  o The patient is encouraged to fill out the menu to get preferences at mealtime  o The patient is educated that if they do not fill out the menu, they will get the standard tray  o The coffee pot is decaf, patient encouraged to order regular coffee from menu.    Educate patient to the meal process   Patient encouraged to eat snacks provided twice daily  o Snacks may stay in patient room     [x] Discharge Process   Discharge expectations   Fill out the survey after discharge   [x] Hygiene   Daily showers encouraged  o Showers availability discussed    Daily dressing encouraged  o Discussed wearing street clothing   Education provided on where to place linens and clothing  o Linens in the hamper  o personal clothing does not go into the linen hamper  [x] Group    Patient encouraged to attend group provided   Time of Group Meetings discussed   Gentle reminder that attendance is a Physician order  [x] Movement   Chair exercises completed   Stretching completed  Notes:Goal - \"To go home\" Electronically signed by CLAUDIA Romo on 1/12/2022 at 9:53 AM

## 2022-01-12 NOTE — DISCHARGE INSTR - DIET

## 2023-11-01 ENCOUNTER — HOSPITAL ENCOUNTER (EMERGENCY)
Age: 33
Discharge: OTHER INSTITUTION | End: 2023-11-01
Attending: EMERGENCY MEDICINE
Payer: OTHER GOVERNMENT

## 2023-11-01 ENCOUNTER — APPOINTMENT (OUTPATIENT)
Dept: GENERAL RADIOLOGY | Age: 33
End: 2023-11-01
Payer: OTHER GOVERNMENT

## 2023-11-01 VITALS
HEART RATE: 98 BPM | RESPIRATION RATE: 16 BRPM | SYSTOLIC BLOOD PRESSURE: 141 MMHG | OXYGEN SATURATION: 98 % | TEMPERATURE: 98.4 F | DIASTOLIC BLOOD PRESSURE: 95 MMHG

## 2023-11-01 DIAGNOSIS — F32.9 MAJOR DEPRESSIVE DISORDER WITH CURRENT ACTIVE EPISODE, UNSPECIFIED DEPRESSION EPISODE SEVERITY, UNSPECIFIED WHETHER RECURRENT: Primary | ICD-10-CM

## 2023-11-01 DIAGNOSIS — F10.920 ACUTE ALCOHOLIC INTOXICATION WITHOUT COMPLICATION (HCC): ICD-10-CM

## 2023-11-01 LAB
ALBUMIN SERPL-MCNC: 4.6 G/DL (ref 3.5–4.6)
ALP SERPL-CCNC: 84 U/L (ref 35–104)
ALT SERPL-CCNC: 36 U/L (ref 0–41)
AMPHET UR QL SCN: NORMAL
ANION GAP SERPL CALCULATED.3IONS-SCNC: 19 MEQ/L (ref 9–15)
APAP SERPL-MCNC: <5 UG/ML (ref 10–30)
AST SERPL-CCNC: 36 U/L (ref 0–40)
BACTERIA URNS QL MICRO: NEGATIVE /HPF
BARBITURATES UR QL SCN: NORMAL
BASOPHILS # BLD: 0.1 K/UL (ref 0–0.2)
BASOPHILS NFR BLD: 0.7 %
BENZODIAZ UR QL SCN: NORMAL
BILIRUB SERPL-MCNC: 0.3 MG/DL (ref 0.2–0.7)
BILIRUB UR QL STRIP: NEGATIVE
BUN SERPL-MCNC: 13 MG/DL (ref 6–20)
CALCIUM SERPL-MCNC: 8.8 MG/DL (ref 8.5–9.9)
CANNABINOIDS UR QL SCN: NORMAL
CHLORIDE SERPL-SCNC: 106 MEQ/L (ref 95–107)
CHOLEST SERPL-MCNC: 242 MG/DL (ref 0–199)
CK SERPL-CCNC: 538 U/L (ref 0–190)
CLARITY UR: CLEAR
CO2 SERPL-SCNC: 22 MEQ/L (ref 20–31)
COCAINE UR QL SCN: NORMAL
COLOR UR: YELLOW
CREAT SERPL-MCNC: 0.97 MG/DL (ref 0.7–1.2)
DRUG SCREEN COMMENT UR-IMP: NORMAL
EOSINOPHIL # BLD: 0 K/UL (ref 0–0.7)
EOSINOPHIL NFR BLD: 0.5 %
EPI CELLS #/AREA URNS AUTO: NORMAL /HPF (ref 0–5)
ERYTHROCYTE [DISTWIDTH] IN BLOOD BY AUTOMATED COUNT: 15.3 % (ref 11.5–14.5)
ETHANOL PERCENT: 0.07 G/DL
ETHANOL PERCENT: 0.27 G/DL
ETHANOLAMINE SERPL-MCNC: 302 MG/DL (ref 0–0.08)
ETHANOLAMINE SERPL-MCNC: 83 MG/DL (ref 0–0.08)
FENTANYL SCREEN, URINE: NORMAL
GLOBULIN SER CALC-MCNC: 3 G/DL (ref 2.3–3.5)
GLUCOSE SERPL-MCNC: 117 MG/DL (ref 70–99)
GLUCOSE UR STRIP-MCNC: NEGATIVE MG/DL
HCT VFR BLD AUTO: 45 % (ref 42–52)
HDLC SERPL-MCNC: 32 MG/DL (ref 40–59)
HGB BLD-MCNC: 14.4 G/DL (ref 14–18)
HGB UR QL STRIP: NEGATIVE
HYALINE CASTS #/AREA URNS AUTO: NORMAL /HPF (ref 0–5)
KETONES UR STRIP-MCNC: ABNORMAL MG/DL
LDLC SERPL CALC-MCNC: ABNORMAL MG/DL (ref 0–129)
LEUKOCYTE ESTERASE UR QL STRIP: NEGATIVE
LYMPHOCYTES # BLD: 4.2 K/UL (ref 1–4.8)
LYMPHOCYTES NFR BLD: 49.7 %
MCH RBC QN AUTO: 27.1 PG (ref 27–31.3)
MCHC RBC AUTO-ENTMCNC: 32 % (ref 33–37)
MCV RBC AUTO: 84.7 FL (ref 79–92.2)
METHADONE UR QL SCN: NORMAL
MONOCYTES # BLD: 0.7 K/UL (ref 0.2–0.8)
MONOCYTES NFR BLD: 8.5 %
NEUTROPHILS # BLD: 3.4 K/UL (ref 1.4–6.5)
NEUTS SEG NFR BLD: 40.4 %
NITRITE UR QL STRIP: NEGATIVE
OPIATES UR QL SCN: NORMAL
OXYCODONE UR QL SCN: NORMAL
PCP UR QL SCN: NORMAL
PH UR STRIP: 6 [PH] (ref 5–9)
PLATELET # BLD AUTO: 446 K/UL (ref 130–400)
POTASSIUM SERPL-SCNC: 3.6 MEQ/L (ref 3.4–4.9)
PROPOXYPH UR QL SCN: NORMAL
PROT SERPL-MCNC: 7.6 G/DL (ref 6.3–8)
PROT UR STRIP-MCNC: 30 MG/DL
RBC # BLD AUTO: 5.31 M/UL (ref 4.7–6.1)
RBC #/AREA URNS AUTO: NORMAL /HPF (ref 0–5)
SALICYLATES SERPL-MCNC: <0.3 MG/DL (ref 15–30)
SARS-COV-2 RDRP RESP QL NAA+PROBE: NOT DETECTED
SODIUM SERPL-SCNC: 147 MEQ/L (ref 135–144)
SP GR UR STRIP: 1.02 (ref 1–1.03)
TRIGL SERPL-MCNC: 643 MG/DL (ref 0–150)
TSH SERPL-MCNC: 4.25 UIU/ML (ref 0.44–3.86)
URINE REFLEX TO CULTURE: ABNORMAL
UROBILINOGEN UR STRIP-ACNC: 1 E.U./DL
WBC # BLD AUTO: 8.4 K/UL (ref 4.8–10.8)
WBC #/AREA URNS AUTO: NORMAL /HPF (ref 0–5)

## 2023-11-01 PROCEDURE — 84443 ASSAY THYROID STIM HORMONE: CPT

## 2023-11-01 PROCEDURE — 80053 COMPREHEN METABOLIC PANEL: CPT

## 2023-11-01 PROCEDURE — 81001 URINALYSIS AUTO W/SCOPE: CPT

## 2023-11-01 PROCEDURE — 71101 X-RAY EXAM UNILAT RIBS/CHEST: CPT

## 2023-11-01 PROCEDURE — 80307 DRUG TEST PRSMV CHEM ANLYZR: CPT

## 2023-11-01 PROCEDURE — 87635 SARS-COV-2 COVID-19 AMP PRB: CPT

## 2023-11-01 PROCEDURE — 85025 COMPLETE CBC W/AUTO DIFF WBC: CPT

## 2023-11-01 PROCEDURE — 82550 ASSAY OF CK (CPK): CPT

## 2023-11-01 PROCEDURE — 80143 DRUG ASSAY ACETAMINOPHEN: CPT

## 2023-11-01 PROCEDURE — 99285 EMERGENCY DEPT VISIT HI MDM: CPT

## 2023-11-01 PROCEDURE — 80061 LIPID PANEL: CPT

## 2023-11-01 PROCEDURE — 93005 ELECTROCARDIOGRAM TRACING: CPT | Performed by: EMERGENCY MEDICINE

## 2023-11-01 PROCEDURE — 36415 COLL VENOUS BLD VENIPUNCTURE: CPT

## 2023-11-01 PROCEDURE — 80179 DRUG ASSAY SALICYLATE: CPT

## 2023-11-01 PROCEDURE — 82077 ASSAY SPEC XCP UR&BREATH IA: CPT

## 2023-11-01 ASSESSMENT — LIFESTYLE VARIABLES
HOW OFTEN DO YOU HAVE A DRINK CONTAINING ALCOHOL: 4 OR MORE TIMES A WEEK
HOW MANY STANDARD DRINKS CONTAINING ALCOHOL DO YOU HAVE ON A TYPICAL DAY: 10 OR MORE

## 2023-11-01 ASSESSMENT — PAIN - FUNCTIONAL ASSESSMENT
PAIN_FUNCTIONAL_ASSESSMENT: NONE - DENIES PAIN
PAIN_FUNCTIONAL_ASSESSMENT: NONE - DENIES PAIN

## 2023-11-01 NOTE — ED NOTES
Pt cooperative with lab at the bedside.        Fátima Nuñez RN  14/39/55 25 [facility] HAI Maxwell  23/76/53 1416

## 2023-11-01 NOTE — ED NOTES
Dr Adriano Hall called back and advised to contact the [place] for admission. Writer called the [place] and case was assigned to a care coordinator.       Natasha Post RN  11/01/23 6594

## 2023-11-01 NOTE — ED NOTES
Constantino ([phone] [id number])  from [place] called to request the pink slip and Covid results to [phone]     Chandrakant Webster RN  11/01/23 1947

## 2023-11-01 NOTE — ED NOTES
Pt uncooperative, agitated on arrival.  Pt refused to change to psych safe clothes or provide urine sample. [employer] present, pt then changed, however refused to provide urine sample. Skin and contraband check performed. Contraband check negative at this time. Lab called to draw blood.        Olayinka Lundy RN  74/39/84 8707       Olayinka Lundy RN  89/64/24 8543

## 2023-11-01 NOTE — ED NOTES
Kemi is ready called to [facility] for transport.      Viry Mcnamara RN  11/01/23 9604

## 2023-11-01 NOTE — ED NOTES
Patient gave permission to call his brother, Obed Runner. While on the phone, Obed Runner states he lives with the patient. He is concerned he may harm himself. This is not the first time he has threatened to harm himself. He was on the phone with the suicide prevention line for veterans for over 3 hours last night/early this morning and then spoke to law enforcement for another hour prior to coming to the hospital.  Obed Runner goes on to say the patient will get a hold of him and say, \"hey, I attempted to do something last night or had thoughts to do something to kill myself\". Brother is very concerned about the patient. States he needs help and his biggest problem is his drinking but he has tried AA and goes for about a week and never goes back. Patient also \"talks shit\" to other people to get them mad so they will fight the patient and brother believes he does this on purpose just to get into fights. Brother states, \"everything stems from his drinking. He needs help, but he won't listen to family\".        Stephanie Cancino RN  11/01/23 8114

## 2023-11-01 NOTE — ED NOTES
Patient resting with eyes closed. Call light within reach. No needs identified.       Reny Ly RN  11/01/23 9837

## 2023-11-01 NOTE — ED NOTES
Patient lunch tray delivered. No needs identified. It was explained to patient that we will need to recheck alcohol level then do a psych assessment prior to disposition. Patient verbalized an understanding.       Sonia Gann RN  11/01/23 9175

## 2023-11-01 NOTE — PROGRESS NOTES
Provisional Diagnosis:   Depression    Psychosocial and Contextual Factors:   alcohol abuse (homelessness, barriers to treatment)    C-SSRS Summary:    C-SSRS Suicide Screening 1) Within the past month, have you wished you were dead or wished you could go to sleep and not wake up? : No  2) Have you actually had any thoughts of killing yourself? : No  6) Have you ever done anything, started to do anything, or prepared to do anything to end your life?: No  Risk of Suicide: No Risk   Patient: Patient denies suicidal ideation    Family: Patient gave permission to speak with Nesha Butt, patient's brother, who advised he is concerned the patient may harm himself \"all of the time\". Agency: Not connected    Substance Abuse: Tox negative. Heavy alcohol use. Present Suicidal Behavior:  Patient called [facility] suicide crisis line and said he was going to kill himself. He was brought in to the hospital pink slipped by [employer]. Verbal: Patient states he was just intoxicated and can not believe he said he wanted to kill himself. Attempt: There was no attempt    Past Suicidal Behavior:     Verbal:  Patient states he does not have a history of being suicidal.       Attempt: Patient has never attempted to commit suicide. Self-Injurious/Self-Mutilation:None      Violence Current or Past No history of violence. Trauma Identified:  Patient currently denies physical, emotional, sexual, financial abuse. States, \"I am not abused\". Protective Factors:    Patient has three children that live in [place]. He currently lives with his two brothers and is employed as a  for "[employer]. Risk Factors:    Patient has a previous history of [facility] admission and currently drinks alcohol heavily.        Clinical Summary:    Josue Quiros is a 28year old male who comes to the ED by EMS after being pink slipped by [employer] for calling a [facility] Suicide Prevention Crisis line and saying he was going to kill

## 2023-11-01 NOTE — ED TRIAGE NOTES
Pt to ED due to suicidal ideation. Pt pink slipped by [employer]. LPD states pt called a  crisis line and stated that he was going to kill himself. Pt is currently denying SI and HI.

## 2023-11-01 NOTE — ED NOTES
Patient resting with eyes closed. Call light within reach. Able to make needs known.       Cristina York RN  11/01/23 0800

## 2023-11-01 NOTE — ED NOTES
Pt eating breakfast  no distress noted, denies any other needs at this time, will continue to monitor.      Antwan Perez, RN  11/01/23 6015 [facility] [address], Derek Ortega RN  11/01/23 6913

## 2023-11-01 NOTE — ED PROVIDER NOTES
for psychiatric evaluation, I believe that he is intoxicated with alcohol on board. Lab work sent. Requires medical clearance, disposition per psychiatry. COVID-19 negative. Labs are remarkable for ethanol 0.073, total cholesterol 242, triglycerides 643, HDL 32, TSH 4.250, . Urinalysis remarkable for 30 protein. Microscopic urinalysis unremarkable. UDS unremarkable. Patient is medically clear for psychiatric evaluation. Patient pink slipped per ED Attending. Patient will be transferred to [facility] for Major Depression with accepting physician Dr. Shira Cotto. REASSESSMENT        CONSULTS:  None    PROCEDURES:  Unless otherwise noted below, none     Procedures    FINAL IMPRESSION      1. Major depressive disorder with current active episode, unspecified depression episode severity, unspecified whether recurrent    2. Acute alcoholic intoxication without complication [facility]          [address] 11/01/2023 08:10:24 PM      PATIENT REFERRED TO:  No follow-up provider specified. DISCHARGE MEDICATIONS:  New Prescriptions    No medications on file     Controlled Substances Monitoring:          No data to display                (Please note that portions of this note were completed with a voice recognition program.  Efforts were made to edit the dictations but occasionally words are mis-transcribed. )    VERITO Baer (electronically signed)    My attending physician is Dr. Roosevelt Schofield.       VERITO Baer  11/01/23 2015

## 2023-11-02 NOTE — ED NOTES
Patient is cooperative with transfer to [address] [place] [address].       Fletcher Sue RN  11/01/23 2018

## 2023-11-03 LAB
EKG ATRIAL RATE: 96 BPM
EKG P AXIS: 27 DEGREES
EKG P-R INTERVAL: 146 MS
EKG Q-T INTERVAL: 348 MS
EKG QRS DURATION: 108 MS
EKG QTC CALCULATION (BAZETT): 439 MS
EKG R AXIS: -4 DEGREES
EKG T AXIS: 41 DEGREES
EKG VENTRICULAR RATE: 96 BPM

## 2025-05-06 ENCOUNTER — HOSPITAL ENCOUNTER (EMERGENCY)
Age: 35
Discharge: HOME | End: 2025-05-06
Attending: STUDENT IN AN ORGANIZED HEALTH CARE EDUCATION/TRAINING PROGRAM
Payer: COMMERCIAL

## 2025-05-06 VITALS
OXYGEN SATURATION: 97 % | SYSTOLIC BLOOD PRESSURE: 155 MMHG | DIASTOLIC BLOOD PRESSURE: 95 MMHG | WEIGHT: 173 LBS | BODY MASS INDEX: 27.92 KG/M2 | TEMPERATURE: 98.2 F | HEART RATE: 130 BPM | RESPIRATION RATE: 21 BRPM

## 2025-05-06 DIAGNOSIS — F10.930 ALCOHOL WITHDRAWAL SYNDROME WITHOUT COMPLICATION (HCC): ICD-10-CM

## 2025-05-06 DIAGNOSIS — F10.10 ALCOHOL ABUSE: Primary | ICD-10-CM

## 2025-05-06 LAB
ALBUMIN SERPL-MCNC: 4.8 G/DL (ref 3.5–4.6)
ALP SERPL-CCNC: 104 U/L (ref 35–104)
ALT SERPL-CCNC: 39 U/L (ref 0–41)
ANION GAP SERPL CALCULATED.3IONS-SCNC: 20 MEQ/L (ref 9–15)
AST SERPL-CCNC: 34 U/L (ref 0–40)
BASOPHILS # BLD: 0 K/UL (ref 0–0.2)
BASOPHILS NFR BLD: 0.3 %
BILIRUB SERPL-MCNC: 1.4 MG/DL (ref 0.2–0.7)
BUN SERPL-MCNC: 14 MG/DL (ref 6–20)
CALCIUM SERPL-MCNC: 10.4 MG/DL (ref 8.5–9.9)
CHLORIDE SERPL-SCNC: 93 MEQ/L (ref 95–107)
CO2 SERPL-SCNC: 20 MEQ/L (ref 20–31)
CREAT SERPL-MCNC: 0.92 MG/DL (ref 0.7–1.2)
EOSINOPHIL # BLD: 0 K/UL (ref 0–0.7)
EOSINOPHIL NFR BLD: 0 %
ERYTHROCYTE [DISTWIDTH] IN BLOOD BY AUTOMATED COUNT: 19.9 % (ref 11.5–14.5)
GLOBULIN SER CALC-MCNC: 3.2 G/DL (ref 2.3–3.5)
GLUCOSE SERPL-MCNC: 109 MG/DL (ref 70–99)
HCT VFR BLD AUTO: 42.8 % (ref 42–52)
HGB BLD-MCNC: 14.6 G/DL (ref 14–18)
LIPASE SERPL-CCNC: 36 U/L (ref 12–95)
LYMPHOCYTES # BLD: 1.6 K/UL (ref 1–4.8)
LYMPHOCYTES NFR BLD: 12 %
MCH RBC QN AUTO: 27.4 PG (ref 27–31.3)
MCHC RBC AUTO-ENTMCNC: 34.1 % (ref 33–37)
MCV RBC AUTO: 80.3 FL (ref 79–92.2)
MONOCYTES # BLD: 0.9 K/UL (ref 0.2–0.8)
MONOCYTES NFR BLD: 6.7 %
NEUTROPHILS # BLD: 10.9 K/UL (ref 1.4–6.5)
NEUTS SEG NFR BLD: 81 %
PLATELET # BLD AUTO: 346 K/UL (ref 130–400)
PLATELET BLD QL SMEAR: ADEQUATE
POTASSIUM SERPL-SCNC: 4.8 MEQ/L (ref 3.4–4.9)
PROT SERPL-MCNC: 8 G/DL (ref 6.3–8)
RBC # BLD AUTO: 5.33 M/UL (ref 4.7–6.1)
SLIDE REVIEW: ABNORMAL
SODIUM SERPL-SCNC: 133 MEQ/L (ref 135–144)
WBC # BLD AUTO: 13.4 K/UL (ref 4.8–10.8)

## 2025-05-06 PROCEDURE — 85025 COMPLETE CBC W/AUTO DIFF WBC: CPT

## 2025-05-06 PROCEDURE — 80053 COMPREHEN METABOLIC PANEL: CPT

## 2025-05-06 PROCEDURE — 83690 ASSAY OF LIPASE: CPT

## 2025-05-06 PROCEDURE — 6360000002 HC RX W HCPCS: Performed by: STUDENT IN AN ORGANIZED HEALTH CARE EDUCATION/TRAINING PROGRAM

## 2025-05-06 PROCEDURE — 99284 EMERGENCY DEPT VISIT MOD MDM: CPT

## 2025-05-06 PROCEDURE — 96374 THER/PROPH/DIAG INJ IV PUSH: CPT

## 2025-05-06 PROCEDURE — 6370000000 HC RX 637 (ALT 250 FOR IP): Performed by: STUDENT IN AN ORGANIZED HEALTH CARE EDUCATION/TRAINING PROGRAM

## 2025-05-06 PROCEDURE — 2580000003 HC RX 258: Performed by: STUDENT IN AN ORGANIZED HEALTH CARE EDUCATION/TRAINING PROGRAM

## 2025-05-06 RX ORDER — LORAZEPAM 2 MG/ML
1 INJECTION INTRAMUSCULAR ONCE
Status: COMPLETED | OUTPATIENT
Start: 2025-05-06 | End: 2025-05-06

## 2025-05-06 RX ORDER — CHLORDIAZEPOXIDE HYDROCHLORIDE 25 MG/1
50 CAPSULE, GELATIN COATED ORAL ONCE
Status: COMPLETED | OUTPATIENT
Start: 2025-05-06 | End: 2025-05-06

## 2025-05-06 RX ORDER — CHLORDIAZEPOXIDE HYDROCHLORIDE 25 MG/1
25 CAPSULE, GELATIN COATED ORAL EVERY 6 HOURS PRN
Qty: 15 CAPSULE | Refills: 3 | Status: SHIPPED | OUTPATIENT
Start: 2025-05-06 | End: 2025-05-10

## 2025-05-06 RX ORDER — 0.9 % SODIUM CHLORIDE 0.9 %
1000 INTRAVENOUS SOLUTION INTRAVENOUS ONCE
Status: COMPLETED | OUTPATIENT
Start: 2025-05-06 | End: 2025-05-06

## 2025-05-06 RX ADMIN — SODIUM CHLORIDE 1000 ML: 0.9 INJECTION, SOLUTION INTRAVENOUS at 14:51

## 2025-05-06 RX ADMIN — Medication 1 MG: at 14:51

## 2025-05-06 RX ADMIN — CHLORDIAZEPOXIDE HYDROCHLORIDE 50 MG: 25 CAPSULE ORAL at 14:50

## 2025-05-06 ASSESSMENT — LIFESTYLE VARIABLES
HOW MANY STANDARD DRINKS CONTAINING ALCOHOL DO YOU HAVE ON A TYPICAL DAY: 10 OR MORE
HOW OFTEN DO YOU HAVE A DRINK CONTAINING ALCOHOL: 4 OR MORE TIMES A WEEK

## 2025-05-06 ASSESSMENT — PAIN - FUNCTIONAL ASSESSMENT: PAIN_FUNCTIONAL_ASSESSMENT: NONE - DENIES PAIN

## 2025-05-06 NOTE — DISCHARGE INSTRUCTIONS
Use the Librium to help with withdrawal symptoms  [facility] can [facility]  Return the emergency department for develop worsening or changing symptoms, chest pain shortness of abdominal pain nausea vomiting confusion thoughts of hurting self or others hallucinations tremors or other worsening symptoms or concerns

## 2025-05-06 NOTE — ED PROVIDER NOTES
[facility] EMERGENCY DEPARTMENT  Emergency Department Encounter  Emergency Medicine      Pt Name: Efe Solo  MRN:[id number]  Birthdate 1990  Date of evaluation: 5/6/25  Time: 2:22 PM EDT  PCP:  No primary care provider on file.    CHIEF COMPLAINT       Chief Complaint   Patient presents with    Alcohol Problem       HISTORY OF PRESENT ILLNESS  (Location/Symptom, Timing/Onset, Context/Setting, Quality, Duration, ModifyingFactors, Severity.)      Efe Solo is a 34 y.o. male presents with requesting help for alcohol detox.  Patient has been drinking a bottle of liquor per day since he was 17 years old.  He said he just got fired from his job and he wants to clean up his act.  He said [facility].  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  [facility] and after they sent him [facility] to follow-up outpatient they had called him and said he should be evaluated in the emergency department  His last drink was yesterday never had withdrawals.  He denies any confusion or visual auditory hallucinations denies any suicidal homicidal ideation.  Denies any headache chest pain shortness of breath palpitations nausea vomiting or fevers.     On ROS patient admits to right upper quadrant pain that he has had for years it is not really different but he said it hurts sometimes when he drinks.    PAST MEDICAL / SURGICAL / SOCIAL /FAMILY HISTORY      has a past medical history of Alcohol abuse and Depression.  No other pertinent PMH on review with patient/guardian.     has no past surgical history on file.  No other pertinent PSH on review with patient/guardian.  Social History     Socioeconomic History    Marital status:      Spouse name: Not on file    Number of children: Not on file    Years of education: Not on file    Highest education level: Not on file   Occupational